# Patient Record
Sex: FEMALE | Race: BLACK OR AFRICAN AMERICAN | NOT HISPANIC OR LATINO
[De-identification: names, ages, dates, MRNs, and addresses within clinical notes are randomized per-mention and may not be internally consistent; named-entity substitution may affect disease eponyms.]

---

## 2017-01-18 ENCOUNTER — APPOINTMENT (OUTPATIENT)
Dept: OBGYN | Facility: HOSPITAL | Age: 37
End: 2017-01-18

## 2017-05-12 ENCOUNTER — MEDICATION RENEWAL (OUTPATIENT)
Age: 37
End: 2017-05-12

## 2017-06-22 ENCOUNTER — LABORATORY RESULT (OUTPATIENT)
Age: 37
End: 2017-06-22

## 2017-06-22 ENCOUNTER — CHART COPY (OUTPATIENT)
Age: 37
End: 2017-06-22

## 2017-06-22 ENCOUNTER — APPOINTMENT (OUTPATIENT)
Dept: OBGYN | Facility: HOSPITAL | Age: 37
End: 2017-06-22

## 2017-06-22 ENCOUNTER — OUTPATIENT (OUTPATIENT)
Dept: OUTPATIENT SERVICES | Facility: HOSPITAL | Age: 37
LOS: 1 days | End: 2017-06-22

## 2017-06-22 VITALS
HEART RATE: 89 BPM | BODY MASS INDEX: 26.66 KG/M2 | DIASTOLIC BLOOD PRESSURE: 71 MMHG | HEIGHT: 69 IN | WEIGHT: 180 LBS | SYSTOLIC BLOOD PRESSURE: 109 MMHG

## 2017-06-22 DIAGNOSIS — O02.1 MISSED ABORTION: Chronic | ICD-10-CM

## 2017-06-22 LAB
ALBUMIN SERPL ELPH-MCNC: 4.6 G/DL — SIGNIFICANT CHANGE UP (ref 3.3–5)
ALP SERPL-CCNC: 61 U/L — SIGNIFICANT CHANGE UP (ref 40–120)
ALT FLD-CCNC: 15 U/L — SIGNIFICANT CHANGE UP (ref 4–33)
AST SERPL-CCNC: 16 U/L — SIGNIFICANT CHANGE UP (ref 4–32)
BASOPHILS # BLD AUTO: 0.01 K/UL — SIGNIFICANT CHANGE UP (ref 0–0.2)
BASOPHILS NFR BLD AUTO: 0.2 % — SIGNIFICANT CHANGE UP (ref 0–2)
BILIRUB SERPL-MCNC: 0.6 MG/DL — SIGNIFICANT CHANGE UP (ref 0.2–1.2)
BUN SERPL-MCNC: 14 MG/DL — SIGNIFICANT CHANGE UP (ref 7–23)
CALCIUM SERPL-MCNC: 9.6 MG/DL — SIGNIFICANT CHANGE UP (ref 8.4–10.5)
CHLORIDE SERPL-SCNC: 101 MMOL/L — SIGNIFICANT CHANGE UP (ref 98–107)
CO2 SERPL-SCNC: 28 MMOL/L — SIGNIFICANT CHANGE UP (ref 22–31)
CREAT SERPL-MCNC: 0.96 MG/DL — SIGNIFICANT CHANGE UP (ref 0.5–1.3)
EOSINOPHIL # BLD AUTO: 0.09 K/UL — SIGNIFICANT CHANGE UP (ref 0–0.5)
EOSINOPHIL NFR BLD AUTO: 1.6 % — SIGNIFICANT CHANGE UP (ref 0–6)
GLUCOSE SERPL-MCNC: 87 MG/DL — SIGNIFICANT CHANGE UP (ref 70–99)
HBA1C BLD-MCNC: 5.9 % — HIGH (ref 4–5.6)
HBV SURFACE AG SER-ACNC: NEGATIVE — SIGNIFICANT CHANGE UP
HCT VFR BLD CALC: 41.5 % — SIGNIFICANT CHANGE UP (ref 34.5–45)
HGB BLD-MCNC: 13.4 G/DL — SIGNIFICANT CHANGE UP (ref 11.5–15.5)
HIV1 AG SER QL: SIGNIFICANT CHANGE UP
HIV1+2 AB SPEC QL: SIGNIFICANT CHANGE UP
IMM GRANULOCYTES NFR BLD AUTO: 0.2 % — SIGNIFICANT CHANGE UP (ref 0–1.5)
LYMPHOCYTES # BLD AUTO: 1.59 K/UL — SIGNIFICANT CHANGE UP (ref 1–3.3)
LYMPHOCYTES # BLD AUTO: 28.3 % — SIGNIFICANT CHANGE UP (ref 13–44)
MCHC RBC-ENTMCNC: 29.8 PG — SIGNIFICANT CHANGE UP (ref 27–34)
MCHC RBC-ENTMCNC: 32.3 % — SIGNIFICANT CHANGE UP (ref 32–36)
MCV RBC AUTO: 92.2 FL — SIGNIFICANT CHANGE UP (ref 80–100)
MONOCYTES # BLD AUTO: 0.37 K/UL — SIGNIFICANT CHANGE UP (ref 0–0.9)
MONOCYTES NFR BLD AUTO: 6.6 % — SIGNIFICANT CHANGE UP (ref 2–14)
NEUTROPHILS # BLD AUTO: 3.55 K/UL — SIGNIFICANT CHANGE UP (ref 1.8–7.4)
NEUTROPHILS NFR BLD AUTO: 63.1 % — SIGNIFICANT CHANGE UP (ref 43–77)
PLATELET # BLD AUTO: 289 K/UL — SIGNIFICANT CHANGE UP (ref 150–400)
PMV BLD: 11.9 FL — SIGNIFICANT CHANGE UP (ref 7–13)
POTASSIUM SERPL-MCNC: 3.8 MMOL/L — SIGNIFICANT CHANGE UP (ref 3.5–5.3)
POTASSIUM SERPL-SCNC: 3.8 MMOL/L — SIGNIFICANT CHANGE UP (ref 3.5–5.3)
PROT SERPL-MCNC: 7.7 G/DL — SIGNIFICANT CHANGE UP (ref 6–8.3)
RBC # BLD: 4.5 M/UL — SIGNIFICANT CHANGE UP (ref 3.8–5.2)
RBC # FLD: 12.8 % — SIGNIFICANT CHANGE UP (ref 10.3–14.5)
SODIUM SERPL-SCNC: 142 MMOL/L — SIGNIFICANT CHANGE UP (ref 135–145)
WBC # BLD: 5.62 K/UL — SIGNIFICANT CHANGE UP (ref 3.8–10.5)
WBC # FLD AUTO: 5.62 K/UL — SIGNIFICANT CHANGE UP (ref 3.8–10.5)

## 2017-06-23 DIAGNOSIS — R10.2 PELVIC AND PERINEAL PAIN: ICD-10-CM

## 2017-06-23 DIAGNOSIS — Z01.419 ENCOUNTER FOR GYNECOLOGICAL EXAMINATION (GENERAL) (ROUTINE) WITHOUT ABNORMAL FINDINGS: ICD-10-CM

## 2017-06-23 DIAGNOSIS — N76.0 ACUTE VAGINITIS: ICD-10-CM

## 2017-06-23 LAB
C TRACH RRNA SPEC QL NAA+PROBE: SIGNIFICANT CHANGE UP
HCV RNA SERPL NAA DL=5-ACNC: NOT DETECTED IU/ML — SIGNIFICANT CHANGE UP
HCV RNA SPEC NAA+PROBE-LOG IU: SIGNIFICANT CHANGE UP LOGIU/ML
N GONORRHOEA RRNA SPEC QL NAA+PROBE: SIGNIFICANT CHANGE UP
SPECIMEN SOURCE: SIGNIFICANT CHANGE UP
T PALLIDUM AB TITR SER: NEGATIVE — SIGNIFICANT CHANGE UP

## 2017-08-18 ENCOUNTER — OUTPATIENT (OUTPATIENT)
Dept: OUTPATIENT SERVICES | Facility: HOSPITAL | Age: 37
LOS: 1 days | End: 2017-08-18
Payer: MEDICAID

## 2017-08-18 ENCOUNTER — APPOINTMENT (OUTPATIENT)
Dept: ULTRASOUND IMAGING | Facility: CLINIC | Age: 37
End: 2017-08-18
Payer: MEDICAID

## 2017-08-18 DIAGNOSIS — O02.1 MISSED ABORTION: Chronic | ICD-10-CM

## 2017-08-18 DIAGNOSIS — R10.2 PELVIC AND PERINEAL PAIN: ICD-10-CM

## 2017-08-18 PROCEDURE — 76830 TRANSVAGINAL US NON-OB: CPT | Mod: 26

## 2017-08-18 PROCEDURE — 76856 US EXAM PELVIC COMPLETE: CPT

## 2017-08-18 PROCEDURE — 76830 TRANSVAGINAL US NON-OB: CPT

## 2017-08-18 PROCEDURE — 76856 US EXAM PELVIC COMPLETE: CPT | Mod: 26

## 2017-10-05 ENCOUNTER — LABORATORY RESULT (OUTPATIENT)
Age: 37
End: 2017-10-05

## 2017-10-05 ENCOUNTER — RESULT CHARGE (OUTPATIENT)
Age: 37
End: 2017-10-05

## 2017-10-05 ENCOUNTER — APPOINTMENT (OUTPATIENT)
Dept: OBGYN | Facility: HOSPITAL | Age: 37
End: 2017-10-05
Payer: MEDICAID

## 2017-10-05 ENCOUNTER — OUTPATIENT (OUTPATIENT)
Dept: OUTPATIENT SERVICES | Facility: HOSPITAL | Age: 37
LOS: 1 days | End: 2017-10-05

## 2017-10-05 VITALS
WEIGHT: 189.82 LBS | HEIGHT: 69 IN | DIASTOLIC BLOOD PRESSURE: 61 MMHG | BODY MASS INDEX: 28.11 KG/M2 | HEART RATE: 59 BPM | SYSTOLIC BLOOD PRESSURE: 103 MMHG

## 2017-10-05 DIAGNOSIS — O02.1 MISSED ABORTION: Chronic | ICD-10-CM

## 2017-10-05 PROCEDURE — 99213 OFFICE O/P EST LOW 20 MIN: CPT | Mod: GE

## 2017-10-05 RX ORDER — METRONIDAZOLE 500 MG/1
500 TABLET ORAL TWICE DAILY
Qty: 14 | Refills: 0 | Status: DISCONTINUED | COMMUNITY
Start: 2017-06-22 | End: 2017-10-05

## 2017-10-06 DIAGNOSIS — N91.2 AMENORRHEA, UNSPECIFIED: ICD-10-CM

## 2017-10-06 LAB
HCG SERPL-ACNC: < 5 MIU/ML — SIGNIFICANT CHANGE UP
HCG UR QL: NEGATIVE
QUALITY CONTROL: YES

## 2017-10-07 LAB — FSH SERPL-MCNC: 5.4 IU/L — SIGNIFICANT CHANGE UP

## 2017-10-16 ENCOUNTER — APPOINTMENT (OUTPATIENT)
Dept: OBGYN | Facility: HOSPITAL | Age: 37
End: 2017-10-16

## 2017-10-20 ENCOUNTER — OUTPATIENT (OUTPATIENT)
Dept: OUTPATIENT SERVICES | Facility: HOSPITAL | Age: 37
LOS: 1 days | End: 2017-10-20
Payer: MEDICAID

## 2017-10-20 ENCOUNTER — APPOINTMENT (OUTPATIENT)
Dept: ULTRASOUND IMAGING | Facility: CLINIC | Age: 37
End: 2017-10-20
Payer: MEDICAID

## 2017-10-20 DIAGNOSIS — Z00.8 ENCOUNTER FOR OTHER GENERAL EXAMINATION: ICD-10-CM

## 2017-10-20 DIAGNOSIS — N91.2 AMENORRHEA, UNSPECIFIED: ICD-10-CM

## 2017-10-20 DIAGNOSIS — O02.1 MISSED ABORTION: Chronic | ICD-10-CM

## 2017-10-20 PROCEDURE — 76831 ECHO EXAM UTERUS: CPT

## 2017-10-20 PROCEDURE — 76831 ECHO EXAM UTERUS: CPT | Mod: 26

## 2017-10-20 PROCEDURE — 58340 CATHETER FOR HYSTEROGRAPHY: CPT

## 2017-10-24 ENCOUNTER — LABORATORY RESULT (OUTPATIENT)
Age: 37
End: 2017-10-24

## 2017-10-24 ENCOUNTER — OUTPATIENT (OUTPATIENT)
Dept: OUTPATIENT SERVICES | Facility: HOSPITAL | Age: 37
LOS: 1 days | End: 2017-10-24

## 2017-10-24 ENCOUNTER — APPOINTMENT (OUTPATIENT)
Dept: OBGYN | Facility: HOSPITAL | Age: 37
End: 2017-10-24
Payer: MEDICAID

## 2017-10-24 VITALS
DIASTOLIC BLOOD PRESSURE: 61 MMHG | WEIGHT: 192 LBS | BODY MASS INDEX: 28.35 KG/M2 | HEART RATE: 67 BPM | SYSTOLIC BLOOD PRESSURE: 95 MMHG

## 2017-10-24 DIAGNOSIS — N91.5 OLIGOMENORRHEA, UNSPECIFIED: ICD-10-CM

## 2017-10-24 DIAGNOSIS — Z30.432 ENCOUNTER FOR REMOVAL OF INTRAUTERINE CONTRACEPTIVE DEVICE: ICD-10-CM

## 2017-10-24 DIAGNOSIS — O02.1 MISSED ABORTION: Chronic | ICD-10-CM

## 2017-10-24 LAB
HCG SERPL-ACNC: < 5 MIU/ML — SIGNIFICANT CHANGE UP
HCG UR QL: NEGATIVE
QUALITY CONTROL: YES
TSH SERPL-MCNC: 0.92 UIU/ML — SIGNIFICANT CHANGE UP (ref 0.27–4.2)

## 2017-10-24 PROCEDURE — 81025 URINE PREGNANCY TEST: CPT | Mod: GC,NC

## 2017-10-24 PROCEDURE — 99213 OFFICE O/P EST LOW 20 MIN: CPT | Mod: GC

## 2017-10-25 LAB — PROLACTIN SERPL-MCNC: 28.5 NG/ML — HIGH (ref 3.4–24.1)

## 2017-10-26 DIAGNOSIS — N91.5 OLIGOMENORRHEA, UNSPECIFIED: ICD-10-CM

## 2017-10-26 DIAGNOSIS — N91.2 AMENORRHEA, UNSPECIFIED: ICD-10-CM

## 2017-10-28 LAB — 17OH-PREG SERPL-MCNC: SIGNIFICANT CHANGE UP

## 2017-10-29 LAB — MISCELLANEOUS - CHEM: SIGNIFICANT CHANGE UP

## 2017-10-30 LAB
TESTOST FLD-SCNC: 60.4 NG/DL — HIGH (ref 10–55)
TESTOSTERONE.FREE+WB SERPL-MCNC: 1 PG/ML — SIGNIFICANT CHANGE UP (ref 0–4.2)

## 2017-11-08 ENCOUNTER — APPOINTMENT (OUTPATIENT)
Dept: OBGYN | Facility: HOSPITAL | Age: 37
End: 2017-11-08

## 2017-11-21 ENCOUNTER — APPOINTMENT (OUTPATIENT)
Dept: OBGYN | Facility: HOSPITAL | Age: 37
End: 2017-11-21
Payer: MEDICAID

## 2017-11-21 ENCOUNTER — LABORATORY RESULT (OUTPATIENT)
Age: 37
End: 2017-11-21

## 2017-11-21 ENCOUNTER — OUTPATIENT (OUTPATIENT)
Dept: OUTPATIENT SERVICES | Facility: HOSPITAL | Age: 37
LOS: 1 days | End: 2017-11-21

## 2017-11-21 VITALS
HEART RATE: 62 BPM | WEIGHT: 189.6 LBS | BODY MASS INDEX: 28.08 KG/M2 | DIASTOLIC BLOOD PRESSURE: 70 MMHG | SYSTOLIC BLOOD PRESSURE: 109 MMHG | HEIGHT: 69 IN

## 2017-11-21 DIAGNOSIS — O02.1 MISSED ABORTION: Chronic | ICD-10-CM

## 2017-11-21 PROCEDURE — 99214 OFFICE O/P EST MOD 30 MIN: CPT | Mod: 25,GC

## 2017-11-21 PROCEDURE — 58300 INSERT INTRAUTERINE DEVICE: CPT | Mod: GC

## 2017-11-21 RX ORDER — MEDROXYPROGESTERONE ACETATE 10 MG/1
10 TABLET ORAL DAILY
Qty: 10 | Refills: 0 | Status: COMPLETED | COMMUNITY
Start: 2017-10-24 | End: 2017-11-21

## 2017-11-22 DIAGNOSIS — N91.2 AMENORRHEA, UNSPECIFIED: ICD-10-CM

## 2017-11-22 DIAGNOSIS — Z30.431 ENCOUNTER FOR ROUTINE CHECKING OF INTRAUTERINE CONTRACEPTIVE DEVICE: ICD-10-CM

## 2017-11-22 LAB
C TRACH RRNA SPEC QL NAA+PROBE: SIGNIFICANT CHANGE UP
N GONORRHOEA RRNA SPEC QL NAA+PROBE: SIGNIFICANT CHANGE UP
SPECIMEN SOURCE: SIGNIFICANT CHANGE UP

## 2018-04-01 ENCOUNTER — OUTPATIENT (OUTPATIENT)
Dept: OUTPATIENT SERVICES | Facility: HOSPITAL | Age: 38
LOS: 1 days | End: 2018-04-01
Payer: MEDICAID

## 2018-04-01 DIAGNOSIS — O02.1 MISSED ABORTION: Chronic | ICD-10-CM

## 2018-04-01 PROCEDURE — G9001: CPT

## 2018-04-11 ENCOUNTER — APPOINTMENT (OUTPATIENT)
Dept: OBGYN | Facility: HOSPITAL | Age: 38
End: 2018-04-11
Payer: MEDICAID

## 2018-04-11 ENCOUNTER — OUTPATIENT (OUTPATIENT)
Dept: OUTPATIENT SERVICES | Facility: HOSPITAL | Age: 38
LOS: 1 days | End: 2018-04-11

## 2018-04-11 VITALS
HEART RATE: 85 BPM | HEIGHT: 69 IN | SYSTOLIC BLOOD PRESSURE: 119 MMHG | WEIGHT: 192 LBS | DIASTOLIC BLOOD PRESSURE: 65 MMHG | BODY MASS INDEX: 28.44 KG/M2

## 2018-04-11 DIAGNOSIS — O02.1 MISSED ABORTION: Chronic | ICD-10-CM

## 2018-04-11 PROCEDURE — 58301 REMOVE INTRAUTERINE DEVICE: CPT | Mod: GC

## 2018-04-11 PROCEDURE — 99213 OFFICE O/P EST LOW 20 MIN: CPT | Mod: 25,GC

## 2018-04-12 DIAGNOSIS — R10.9 UNSPECIFIED ABDOMINAL PAIN: ICD-10-CM

## 2018-04-12 DIAGNOSIS — Z30.9 ENCOUNTER FOR CONTRACEPTIVE MANAGEMENT, UNSPECIFIED: ICD-10-CM

## 2018-04-14 DIAGNOSIS — R69 ILLNESS, UNSPECIFIED: ICD-10-CM

## 2018-05-02 ENCOUNTER — LABORATORY RESULT (OUTPATIENT)
Age: 38
End: 2018-05-02

## 2018-05-02 ENCOUNTER — APPOINTMENT (OUTPATIENT)
Dept: OBGYN | Facility: HOSPITAL | Age: 38
End: 2018-05-02
Payer: MEDICAID

## 2018-05-02 ENCOUNTER — OUTPATIENT (OUTPATIENT)
Dept: OUTPATIENT SERVICES | Facility: HOSPITAL | Age: 38
LOS: 1 days | End: 2018-05-02

## 2018-05-02 VITALS
SYSTOLIC BLOOD PRESSURE: 110 MMHG | BODY MASS INDEX: 28.88 KG/M2 | WEIGHT: 195 LBS | HEART RATE: 85 BPM | HEIGHT: 69 IN | DIASTOLIC BLOOD PRESSURE: 79 MMHG

## 2018-05-02 DIAGNOSIS — Z86.19 PERSONAL HISTORY OF OTHER INFECTIOUS AND PARASITIC DISEASES: ICD-10-CM

## 2018-05-02 DIAGNOSIS — O02.1 MISSED ABORTION: Chronic | ICD-10-CM

## 2018-05-02 PROCEDURE — 99213 OFFICE O/P EST LOW 20 MIN: CPT | Mod: GE

## 2018-05-03 LAB
C TRACH RRNA SPEC QL NAA+PROBE: SIGNIFICANT CHANGE UP
HIV 1+2 AB+HIV1 P24 AG SERPL QL IA: SIGNIFICANT CHANGE UP
N GONORRHOEA RRNA SPEC QL NAA+PROBE: SIGNIFICANT CHANGE UP
SPECIMEN SOURCE: SIGNIFICANT CHANGE UP
SPECIMEN SOURCE: SIGNIFICANT CHANGE UP
T PALLIDUM AB TITR SER: NEGATIVE — SIGNIFICANT CHANGE UP
TRICHOMONAS VAGINALIS AMPLIFICATION RESULT: SIGNIFICANT CHANGE UP

## 2018-05-04 DIAGNOSIS — Z11.3 ENCOUNTER FOR SCREENING FOR INFECTIONS WITH A PREDOMINANTLY SEXUAL MODE OF TRANSMISSION: ICD-10-CM

## 2018-05-04 DIAGNOSIS — B37.3 CANDIDIASIS OF VULVA AND VAGINA: ICD-10-CM

## 2018-05-09 ENCOUNTER — EMERGENCY (EMERGENCY)
Facility: HOSPITAL | Age: 38
LOS: 1 days | End: 2018-05-09
Attending: EMERGENCY MEDICINE
Payer: MEDICAID

## 2018-05-09 VITALS
HEIGHT: 69 IN | WEIGHT: 190.04 LBS | HEART RATE: 70 BPM | OXYGEN SATURATION: 97 % | RESPIRATION RATE: 16 BRPM | SYSTOLIC BLOOD PRESSURE: 96 MMHG | DIASTOLIC BLOOD PRESSURE: 65 MMHG | TEMPERATURE: 98 F

## 2018-05-09 DIAGNOSIS — O02.1 MISSED ABORTION: Chronic | ICD-10-CM

## 2018-05-09 PROCEDURE — 99283 EMERGENCY DEPT VISIT LOW MDM: CPT

## 2018-05-09 PROCEDURE — 73562 X-RAY EXAM OF KNEE 3: CPT | Mod: 26,LT,RT

## 2018-05-09 PROCEDURE — 73660 X-RAY EXAM OF TOE(S): CPT | Mod: 26,LT

## 2018-05-09 PROCEDURE — 99284 EMERGENCY DEPT VISIT MOD MDM: CPT

## 2018-05-09 PROCEDURE — 73562 X-RAY EXAM OF KNEE 3: CPT

## 2018-05-09 PROCEDURE — 73660 X-RAY EXAM OF TOE(S): CPT

## 2018-05-09 RX ORDER — IBUPROFEN 200 MG
600 TABLET ORAL ONCE
Qty: 0 | Refills: 0 | Status: COMPLETED | OUTPATIENT
Start: 2018-05-09 | End: 2018-05-09

## 2018-05-09 RX ADMIN — Medication 600 MILLIGRAM(S): at 15:19

## 2018-05-09 RX ADMIN — Medication 600 MILLIGRAM(S): at 17:12

## 2018-05-09 NOTE — ED PROVIDER NOTE - PHYSICAL EXAMINATION
Attending note. Patient is alert and in no acute distress. Examination of the lower extremities reveals no swelling, erythema, ecchymosis or effusions. Patient has full range of motion of both knees. Patient has tenderness over the medial femoral condyles and tibial plateau bilaterally. There is no pain with valgus or varus stress of the knees. ACL is intact when stressed. There is no leg edema. Patient is a tender left second toe with swelling and resolving ecchymosis. Maximal tenderness is at the proximal phalanx. Distal pulses were intact and normal. Sensation is normal. Skin is otherwise normal.

## 2018-05-09 NOTE — ED ADULT NURSE NOTE - OBJECTIVE STATEMENT
38 y/o female presents to ED from home c/o b/l knee pain s/p fall while roller-skating. Patient reports she was roller-skating on Friday when she fell multiple times, knocking her knees together. Patient reports difficulty ambulating and stiffness when being in the same position for an extended period of time. Patient has +pulses, +sensation, +ROM in b/l LE, equal strength. Patient denies LOC, blood thinners, hitting head. Patient resting in bed, side rails up. Plan of care explained.

## 2018-05-09 NOTE — ED PROCEDURE NOTE - CPROC ED POST PROC CARE GUIDE1
Elevate the injured extremity as instructed./Instructed patient/caregiver to follow-up with primary care physician./Instructed patient/caregiver regarding signs and symptoms of infection./Keep the cast/splint/dressing clean and dry.

## 2018-05-09 NOTE — ED PROVIDER NOTE - MEDICAL DECISION MAKING DETAILS
Attending note-bilateral knee pain probably secondary to contusions. No pain with stress of the MCL. X-ray rule out fracture. X-ray of the left second toe to rule out fracture as well.

## 2018-05-09 NOTE — ED PROVIDER NOTE - CARE PLAN
Principal Discharge DX:	Contusion of knee, unspecified laterality, initial encounter Principal Discharge DX:	Contusion of knee, unspecified laterality, initial encounter  Goal:	bilateral knee contusions

## 2019-04-11 ENCOUNTER — LABORATORY RESULT (OUTPATIENT)
Age: 39
End: 2019-04-11

## 2019-04-11 ENCOUNTER — RESULT REVIEW (OUTPATIENT)
Age: 39
End: 2019-04-11

## 2019-04-11 ENCOUNTER — OUTPATIENT (OUTPATIENT)
Dept: OUTPATIENT SERVICES | Facility: HOSPITAL | Age: 39
LOS: 1 days | End: 2019-04-11

## 2019-04-11 ENCOUNTER — APPOINTMENT (OUTPATIENT)
Dept: OBGYN | Facility: HOSPITAL | Age: 39
End: 2019-04-11
Payer: MEDICAID

## 2019-04-11 VITALS
BODY MASS INDEX: 27.4 KG/M2 | DIASTOLIC BLOOD PRESSURE: 60 MMHG | HEART RATE: 79 BPM | WEIGHT: 185 LBS | SYSTOLIC BLOOD PRESSURE: 88 MMHG | HEIGHT: 69 IN

## 2019-04-11 DIAGNOSIS — Z20.2 CONTACT WITH AND (SUSPECTED) EXPOSURE TO INFECTIONS WITH A PREDOMINANTLY SEXUAL MODE OF TRANSMISSION: ICD-10-CM

## 2019-04-11 DIAGNOSIS — O02.1 MISSED ABORTION: Chronic | ICD-10-CM

## 2019-04-11 DIAGNOSIS — Z01.419 ENCOUNTER FOR GYNECOLOGICAL EXAMINATION (GENERAL) (ROUTINE) WITHOUT ABNORMAL FINDINGS: ICD-10-CM

## 2019-04-11 PROCEDURE — 99212 OFFICE O/P EST SF 10 MIN: CPT | Mod: 25,GC

## 2019-04-11 PROCEDURE — 99395 PREV VISIT EST AGE 18-39: CPT | Mod: GC

## 2019-04-12 LAB
C TRACH RRNA SPEC QL NAA+PROBE: SIGNIFICANT CHANGE UP
HBV SURFACE AG SER-ACNC: NONREACTIVE — SIGNIFICANT CHANGE UP
HIV 1+2 AB+HIV1 P24 AG SERPL QL IA: SIGNIFICANT CHANGE UP
HPV HIGH+LOW RISK DNA PNL CVX: SIGNIFICANT CHANGE UP
N GONORRHOEA RRNA SPEC QL NAA+PROBE: SIGNIFICANT CHANGE UP
SPECIMEN SOURCE: SIGNIFICANT CHANGE UP
T PALLIDUM AB TITR SER: NEGATIVE — SIGNIFICANT CHANGE UP

## 2019-04-15 LAB — CYTOLOGY SPEC DOC CYTO: SIGNIFICANT CHANGE UP

## 2019-04-27 NOTE — END OF VISIT
[] : Resident [>50% of Time Spent on Counseling and Coordination of Care for  ___] : Greater than 50% of the encounter time was spent on counseling and coordination of care for [unfilled] [Time Spent: ___ minutes] : I have spent [unfilled] minutes of face to face time with the patient [FreeTextEntry3] : discussed STD SCREENING

## 2019-04-27 NOTE — PHYSICAL EXAM
[Awake] : awake [Alert] : alert [Soft] : soft [Oriented x3] : oriented to person, place, and time [Normal] : uterus [No Bleeding] : there was no active vaginal bleeding [Pap Obtained] : a Pap smear was performed [Uterine Adnexae] : were not tender and not enlarged [Acute Distress] : no acute distress [Mass] : no breast mass [Nipple Discharge] : no nipple discharge [Axillary LAD] : no axillary lymphadenopathy [Tender] : non tender

## 2019-07-05 ENCOUNTER — LABORATORY RESULT (OUTPATIENT)
Age: 39
End: 2019-07-05

## 2019-07-05 ENCOUNTER — APPOINTMENT (OUTPATIENT)
Dept: INTERNAL MEDICINE | Facility: CLINIC | Age: 39
End: 2019-07-05
Payer: MEDICAID

## 2019-07-05 ENCOUNTER — OUTPATIENT (OUTPATIENT)
Dept: OUTPATIENT SERVICES | Facility: HOSPITAL | Age: 39
LOS: 1 days | End: 2019-07-05

## 2019-07-05 VITALS
SYSTOLIC BLOOD PRESSURE: 118 MMHG | DIASTOLIC BLOOD PRESSURE: 70 MMHG | HEART RATE: 82 BPM | BODY MASS INDEX: 26.81 KG/M2 | HEIGHT: 69 IN | WEIGHT: 181 LBS

## 2019-07-05 DIAGNOSIS — R53.83 OTHER FATIGUE: ICD-10-CM

## 2019-07-05 DIAGNOSIS — Z00.00 ENCOUNTER FOR GENERAL ADULT MEDICAL EXAMINATION WITHOUT ABNORMAL FINDINGS: ICD-10-CM

## 2019-07-05 DIAGNOSIS — R51 HEADACHE: ICD-10-CM

## 2019-07-05 DIAGNOSIS — R73.03 PREDIABETES.: ICD-10-CM

## 2019-07-05 DIAGNOSIS — R73.03 PREDIABETES: ICD-10-CM

## 2019-07-05 DIAGNOSIS — O02.1 MISSED ABORTION: Chronic | ICD-10-CM

## 2019-07-05 LAB
ALBUMIN SERPL ELPH-MCNC: 4.9 G/DL — SIGNIFICANT CHANGE UP (ref 3.3–5)
ALP SERPL-CCNC: 54 U/L — SIGNIFICANT CHANGE UP (ref 40–120)
ALT FLD-CCNC: 16 U/L — SIGNIFICANT CHANGE UP (ref 4–33)
ANION GAP SERPL CALC-SCNC: 10 MMO/L — SIGNIFICANT CHANGE UP (ref 7–14)
AST SERPL-CCNC: 15 U/L — SIGNIFICANT CHANGE UP (ref 4–32)
BASOPHILS # BLD AUTO: 0.03 K/UL — SIGNIFICANT CHANGE UP (ref 0–0.2)
BASOPHILS NFR BLD AUTO: 0.6 % — SIGNIFICANT CHANGE UP (ref 0–2)
BILIRUB SERPL-MCNC: 0.4 MG/DL — SIGNIFICANT CHANGE UP (ref 0.2–1.2)
BUN SERPL-MCNC: 12 MG/DL — SIGNIFICANT CHANGE UP (ref 7–23)
CALCIUM SERPL-MCNC: 9.7 MG/DL — SIGNIFICANT CHANGE UP (ref 8.4–10.5)
CHLORIDE SERPL-SCNC: 105 MMOL/L — SIGNIFICANT CHANGE UP (ref 98–107)
CHOLEST SERPL-MCNC: 181 MG/DL — SIGNIFICANT CHANGE UP (ref 120–199)
CO2 SERPL-SCNC: 26 MMOL/L — SIGNIFICANT CHANGE UP (ref 22–31)
CREAT SERPL-MCNC: 0.82 MG/DL — SIGNIFICANT CHANGE UP (ref 0.5–1.3)
EOSINOPHIL # BLD AUTO: 0.07 K/UL — SIGNIFICANT CHANGE UP (ref 0–0.5)
EOSINOPHIL NFR BLD AUTO: 1.4 % — SIGNIFICANT CHANGE UP (ref 0–6)
GLUCOSE BLDC GLUCOMTR-MCNC: 87
GLUCOSE SERPL-MCNC: 100 MG/DL — HIGH (ref 70–99)
HBA1C BLD-MCNC: 5.2 % — SIGNIFICANT CHANGE UP (ref 4–5.6)
HCT VFR BLD CALC: 41.8 % — SIGNIFICANT CHANGE UP (ref 34.5–45)
HDLC SERPL-MCNC: 51 MG/DL — SIGNIFICANT CHANGE UP (ref 45–65)
HGB BLD-MCNC: 13.5 G/DL — SIGNIFICANT CHANGE UP (ref 11.5–15.5)
IMM GRANULOCYTES NFR BLD AUTO: 0.2 % — SIGNIFICANT CHANGE UP (ref 0–1.5)
LIPID PNL WITH DIRECT LDL SERPL: 132 MG/DL — SIGNIFICANT CHANGE UP
LYMPHOCYTES # BLD AUTO: 1.89 K/UL — SIGNIFICANT CHANGE UP (ref 1–3.3)
LYMPHOCYTES # BLD AUTO: 37.9 % — SIGNIFICANT CHANGE UP (ref 13–44)
MCHC RBC-ENTMCNC: 30 PG — SIGNIFICANT CHANGE UP (ref 27–34)
MCHC RBC-ENTMCNC: 32.3 % — SIGNIFICANT CHANGE UP (ref 32–36)
MCV RBC AUTO: 92.9 FL — SIGNIFICANT CHANGE UP (ref 80–100)
MONOCYTES # BLD AUTO: 0.37 K/UL — SIGNIFICANT CHANGE UP (ref 0–0.9)
MONOCYTES NFR BLD AUTO: 7.4 % — SIGNIFICANT CHANGE UP (ref 2–14)
NEUTROPHILS # BLD AUTO: 2.62 K/UL — SIGNIFICANT CHANGE UP (ref 1.8–7.4)
NEUTROPHILS NFR BLD AUTO: 52.5 % — SIGNIFICANT CHANGE UP (ref 43–77)
NRBC # FLD: 0 K/UL — SIGNIFICANT CHANGE UP (ref 0–0)
PLATELET # BLD AUTO: 242 K/UL — SIGNIFICANT CHANGE UP (ref 150–400)
PMV BLD: 11.9 FL — SIGNIFICANT CHANGE UP (ref 7–13)
POTASSIUM SERPL-MCNC: 4.4 MMOL/L — SIGNIFICANT CHANGE UP (ref 3.5–5.3)
POTASSIUM SERPL-SCNC: 4.4 MMOL/L — SIGNIFICANT CHANGE UP (ref 3.5–5.3)
PROT SERPL-MCNC: 7.8 G/DL — SIGNIFICANT CHANGE UP (ref 6–8.3)
RBC # BLD: 4.5 M/UL — SIGNIFICANT CHANGE UP (ref 3.8–5.2)
RBC # FLD: 12.8 % — SIGNIFICANT CHANGE UP (ref 10.3–14.5)
SODIUM SERPL-SCNC: 141 MMOL/L — SIGNIFICANT CHANGE UP (ref 135–145)
T4 FREE SERPL-MCNC: 0.89 NG/DL — LOW (ref 0.9–1.8)
TRIGL SERPL-MCNC: 47 MG/DL — SIGNIFICANT CHANGE UP (ref 10–149)
TSH SERPL-MCNC: 1.05 UIU/ML — SIGNIFICANT CHANGE UP (ref 0.27–4.2)
WBC # BLD: 4.99 K/UL — SIGNIFICANT CHANGE UP (ref 3.8–10.5)
WBC # FLD AUTO: 4.99 K/UL — SIGNIFICANT CHANGE UP (ref 3.8–10.5)

## 2019-07-05 PROCEDURE — 99385 PREV VISIT NEW AGE 18-39: CPT

## 2019-07-05 RX ORDER — FLUCONAZOLE 150 MG/1
150 TABLET ORAL
Qty: 1 | Refills: 0 | Status: DISCONTINUED | COMMUNITY
Start: 2018-05-02 | End: 2019-07-05

## 2019-07-05 NOTE — ASSESSMENT
[FreeTextEntry1] : 39yoF presents to establish care.\par \par #HCM\par -Pap with HPV negative 4/2019 - repeat 2024\par -STI screening negative 4/2019\par -No recent Tdap - patient wants to defer until next visit\par -Labwork today: CBC, CMP, A1c, lipid panel, TSH with reflex fT4\par \par #Fatigue\par -Checking CBC, metabolic panel, TSH, A1c today\par -Patient does not currently feel that fatigue is related to mood but will readdress if labwork is WNL - patient is currently trying to establish care with a new therapist

## 2019-07-05 NOTE — HISTORY OF PRESENT ILLNESS
[de-identified] : 39yoF presents to establish care. Patient recently seen by gynecology 4/2019 for annual exam - pap with HPV and STI screening at that visit were WNL. \par \par Fatigue: Patient states that she feels tired all the time - she feels that this has been worse in the past few months. She is concerned about diabetes because she has a family history of this (she last had an A1c checked in 2017 and it was 5.9%). She states that she may have had mild polydipsia but denies polyuria or major weight changes in the past few months. She feels foggy at times at well and endorses HAs very frequently now. She also endorses feeling cold all of the time and feeling mildly constipated (still having BM every day thought usually). She did not eat breakfast this morning. She states that her menstruation has been fairly regular over this period - LMP about 3 weeks ago, on the lighter side usually. Patient has been taking gymnema kitty and fenugreek for about 2 weeks to try to help with symptoms - not sure if they are helping. POC FS 87.\par \par Depressed mood: Patient states that she has seen a therapist in the past and is currently finding another one. She was on an anti-depressant in the past but does not currently take medications. She is currently bothered by the political climate and her tiredness - states that her mood is "even" and not very sad or very happy. Denies feeling down or depressed and is still participating in activities that she enjoys (except when she is feeling too tired). She has no current SI/HI.

## 2019-07-05 NOTE — HEALTH RISK ASSESSMENT
[No] : No [Yes] : In the past 12 months have you used drugs other than those required for medical reasons? Yes [Patient reported PAP Smear was normal] : Patient reported PAP Smear was normal [Employed] : employed [Single] : single [Fully functional (bathing, dressing, toileting, transferring, walking, feeding)] : Fully functional (bathing, dressing, toileting, transferring, walking, feeding) [Fully functional (using the telephone, shopping, preparing meals, housekeeping, doing laundry, using] : Fully functional and needs no help or supervision to perform IADLs (using the telephone, shopping, preparing meals, housekeeping, doing laundry, using transportation, managing medications and managing finances) [Fair] :  ~his/her~ mood as fair [0] : 1) Little interest or pleasure doing things: Not at all (0) [FreeTextEntry1] : tiredness [] : No [de-identified] : marijuana very infrequently [de-identified] : no regular exercise [de-identified] : eats fruits and vegetables, eats proteins, cutting back on rice [EPM2Pmqui] : 0 [PapSmearDate] : 4/2019 [HIVComments] : negative [HIVDate] : 4/19 [HepatitisCDate] : 2/16 [HepatitisCComments] : negative [de-identified] : with friend, son, and god-daughter [FreeTextEntry2] : works as a [employer]

## 2019-07-05 NOTE — PHYSICAL EXAM
[Normal Sclera/Conjunctiva] : normal sclera/conjunctiva [PERRL] : pupils equal round and reactive to light [EOMI] : extraocular movements intact [Supple] : supple [Normal Oropharynx] : the oropharynx was normal [No Lymphadenopathy] : no lymphadenopathy [Normal] : no respiratory distress, lungs were clear to auscultation bilaterally and no accessory muscle use [Normal Rate] : normal rate  [Regular Rhythm] : with a regular rhythm [Normal S1, S2] : normal S1 and S2 [Pedal Pulses Present] : the pedal pulses are present [No Edema] : there was no peripheral edema [No Masses] : no palpable masses [Normal Appearance] : normal in appearance [Soft] : abdomen soft [No Nipple Discharge] : no nipple discharge [No Axillary Lymphadenopathy] : no axillary lymphadenopathy [Non Tender] : non-tender [Normal Bowel Sounds] : normal bowel sounds [Non-distended] : non-distended [Grossly Normal Strength/Tone] : grossly normal strength/tone [No Focal Deficits] : no focal deficits [Normal Insight/Judgement] : insight and judgment were intact [Normal Affect] : the affect was normal

## 2019-07-05 NOTE — REVIEW OF SYSTEMS
[Fatigue] : fatigue [Fever] : no fever [Chills] : no chills [Chest Pain] : no chest pain [Recent Change In Weight] : ~T no recent weight change [Shortness Of Breath] : no shortness of breath [Dysuria] : no dysuria [Frequency] : no frequency

## 2020-03-04 ENCOUNTER — EMERGENCY (EMERGENCY)
Facility: HOSPITAL | Age: 40
LOS: 1 days | Discharge: HOME | End: 2020-03-04
Attending: EMERGENCY MEDICINE
Payer: MEDICAID

## 2020-03-04 VITALS
HEART RATE: 71 BPM | SYSTOLIC BLOOD PRESSURE: 110 MMHG | RESPIRATION RATE: 16 BRPM | DIASTOLIC BLOOD PRESSURE: 68 MMHG | OXYGEN SATURATION: 100 % | TEMPERATURE: 98 F

## 2020-03-04 VITALS
SYSTOLIC BLOOD PRESSURE: 107 MMHG | RESPIRATION RATE: 16 BRPM | WEIGHT: 197.98 LBS | OXYGEN SATURATION: 96 % | HEART RATE: 90 BPM | HEIGHT: 69 IN | DIASTOLIC BLOOD PRESSURE: 72 MMHG | TEMPERATURE: 98 F

## 2020-03-04 DIAGNOSIS — O02.1 MISSED ABORTION: Chronic | ICD-10-CM

## 2020-03-04 LAB
ALBUMIN SERPL ELPH-MCNC: 4.1 G/DL — SIGNIFICANT CHANGE UP (ref 3.3–5)
ALP SERPL-CCNC: 63 U/L — SIGNIFICANT CHANGE UP (ref 40–120)
ALT FLD-CCNC: 15 U/L — SIGNIFICANT CHANGE UP (ref 10–45)
ANION GAP SERPL CALC-SCNC: 12 MMOL/L — SIGNIFICANT CHANGE UP (ref 5–17)
APPEARANCE UR: CLEAR — SIGNIFICANT CHANGE UP
AST SERPL-CCNC: 15 U/L — SIGNIFICANT CHANGE UP (ref 10–40)
BACTERIA # UR AUTO: ABNORMAL
BASOPHILS # BLD AUTO: 0.02 K/UL — SIGNIFICANT CHANGE UP (ref 0–0.2)
BASOPHILS NFR BLD AUTO: 0.4 % — SIGNIFICANT CHANGE UP (ref 0–2)
BILIRUB SERPL-MCNC: 0.4 MG/DL — SIGNIFICANT CHANGE UP (ref 0.2–1.2)
BILIRUB UR-MCNC: NEGATIVE — SIGNIFICANT CHANGE UP
BUN SERPL-MCNC: 13 MG/DL — SIGNIFICANT CHANGE UP (ref 7–23)
CALCIUM SERPL-MCNC: 9 MG/DL — SIGNIFICANT CHANGE UP (ref 8.4–10.5)
CHLORIDE SERPL-SCNC: 101 MMOL/L — SIGNIFICANT CHANGE UP (ref 96–108)
CO2 SERPL-SCNC: 22 MMOL/L — SIGNIFICANT CHANGE UP (ref 22–31)
COLOR SPEC: YELLOW — SIGNIFICANT CHANGE UP
CREAT SERPL-MCNC: 0.82 MG/DL — SIGNIFICANT CHANGE UP (ref 0.5–1.3)
DIFF PNL FLD: ABNORMAL
EOSINOPHIL # BLD AUTO: 0.02 K/UL — SIGNIFICANT CHANGE UP (ref 0–0.5)
EOSINOPHIL NFR BLD AUTO: 0.4 % — SIGNIFICANT CHANGE UP (ref 0–6)
EPI CELLS # UR: 5 /HPF — SIGNIFICANT CHANGE UP
GLUCOSE SERPL-MCNC: 100 MG/DL — HIGH (ref 70–99)
GLUCOSE UR QL: NEGATIVE — SIGNIFICANT CHANGE UP
HCG UR QL: NEGATIVE — SIGNIFICANT CHANGE UP
HCT VFR BLD CALC: 40.8 % — SIGNIFICANT CHANGE UP (ref 34.5–45)
HGB BLD-MCNC: 13.6 G/DL — SIGNIFICANT CHANGE UP (ref 11.5–15.5)
HYALINE CASTS # UR AUTO: 5 /LPF — HIGH (ref 0–2)
IMM GRANULOCYTES NFR BLD AUTO: 0.2 % — SIGNIFICANT CHANGE UP (ref 0–1.5)
KETONES UR-MCNC: NEGATIVE — SIGNIFICANT CHANGE UP
LEUKOCYTE ESTERASE UR-ACNC: NEGATIVE — SIGNIFICANT CHANGE UP
LIDOCAIN IGE QN: 17 U/L — SIGNIFICANT CHANGE UP (ref 7–60)
LYMPHOCYTES # BLD AUTO: 0.89 K/UL — LOW (ref 1–3.3)
LYMPHOCYTES # BLD AUTO: 16.4 % — SIGNIFICANT CHANGE UP (ref 13–44)
MCHC RBC-ENTMCNC: 30.3 PG — SIGNIFICANT CHANGE UP (ref 27–34)
MCHC RBC-ENTMCNC: 33.3 GM/DL — SIGNIFICANT CHANGE UP (ref 32–36)
MCV RBC AUTO: 90.9 FL — SIGNIFICANT CHANGE UP (ref 80–100)
MONOCYTES # BLD AUTO: 0.36 K/UL — SIGNIFICANT CHANGE UP (ref 0–0.9)
MONOCYTES NFR BLD AUTO: 6.6 % — SIGNIFICANT CHANGE UP (ref 2–14)
NEUTROPHILS # BLD AUTO: 4.13 K/UL — SIGNIFICANT CHANGE UP (ref 1.8–7.4)
NEUTROPHILS NFR BLD AUTO: 76 % — SIGNIFICANT CHANGE UP (ref 43–77)
NITRITE UR-MCNC: NEGATIVE — SIGNIFICANT CHANGE UP
NRBC # BLD: 0 /100 WBCS — SIGNIFICANT CHANGE UP (ref 0–0)
PH UR: 6 — SIGNIFICANT CHANGE UP (ref 5–8)
PLATELET # BLD AUTO: 224 K/UL — SIGNIFICANT CHANGE UP (ref 150–400)
POTASSIUM SERPL-MCNC: 3.9 MMOL/L — SIGNIFICANT CHANGE UP (ref 3.5–5.3)
POTASSIUM SERPL-SCNC: 3.9 MMOL/L — SIGNIFICANT CHANGE UP (ref 3.5–5.3)
PROT SERPL-MCNC: 7.2 G/DL — SIGNIFICANT CHANGE UP (ref 6–8.3)
PROT UR-MCNC: ABNORMAL
RBC # BLD: 4.49 M/UL — SIGNIFICANT CHANGE UP (ref 3.8–5.2)
RBC # FLD: 12 % — SIGNIFICANT CHANGE UP (ref 10.3–14.5)
RBC CASTS # UR COMP ASSIST: 3 /HPF — SIGNIFICANT CHANGE UP (ref 0–4)
SODIUM SERPL-SCNC: 135 MMOL/L — SIGNIFICANT CHANGE UP (ref 135–145)
SP GR SPEC: 1.03 — HIGH (ref 1.01–1.02)
UROBILINOGEN FLD QL: NEGATIVE — SIGNIFICANT CHANGE UP
WBC # BLD: 5.43 K/UL — SIGNIFICANT CHANGE UP (ref 3.8–10.5)
WBC # FLD AUTO: 5.43 K/UL — SIGNIFICANT CHANGE UP (ref 3.8–10.5)
WBC UR QL: 3 /HPF — SIGNIFICANT CHANGE UP (ref 0–5)

## 2020-03-04 PROCEDURE — 83690 ASSAY OF LIPASE: CPT

## 2020-03-04 PROCEDURE — 81025 URINE PREGNANCY TEST: CPT

## 2020-03-04 PROCEDURE — 99282 EMERGENCY DEPT VISIT SF MDM: CPT

## 2020-03-04 PROCEDURE — 85027 COMPLETE CBC AUTOMATED: CPT

## 2020-03-04 PROCEDURE — 93005 ELECTROCARDIOGRAM TRACING: CPT

## 2020-03-04 PROCEDURE — 96374 THER/PROPH/DIAG INJ IV PUSH: CPT

## 2020-03-04 PROCEDURE — 99285 EMERGENCY DEPT VISIT HI MDM: CPT

## 2020-03-04 PROCEDURE — 76705 ECHO EXAM OF ABDOMEN: CPT

## 2020-03-04 PROCEDURE — 93010 ELECTROCARDIOGRAM REPORT: CPT

## 2020-03-04 PROCEDURE — 81001 URINALYSIS AUTO W/SCOPE: CPT

## 2020-03-04 PROCEDURE — 76705 ECHO EXAM OF ABDOMEN: CPT | Mod: 26

## 2020-03-04 PROCEDURE — 99284 EMERGENCY DEPT VISIT MOD MDM: CPT | Mod: 25

## 2020-03-04 PROCEDURE — 87086 URINE CULTURE/COLONY COUNT: CPT

## 2020-03-04 PROCEDURE — 80053 COMPREHEN METABOLIC PANEL: CPT

## 2020-03-04 RX ORDER — SUCRALFATE 1 G
1 TABLET ORAL ONCE
Refills: 0 | Status: COMPLETED | OUTPATIENT
Start: 2020-03-04 | End: 2020-03-04

## 2020-03-04 RX ORDER — ONDANSETRON 8 MG/1
4 TABLET, FILM COATED ORAL ONCE
Refills: 0 | Status: DISCONTINUED | OUTPATIENT
Start: 2020-03-04 | End: 2020-03-04

## 2020-03-04 RX ORDER — FAMOTIDINE 10 MG/ML
20 INJECTION INTRAVENOUS ONCE
Refills: 0 | Status: COMPLETED | OUTPATIENT
Start: 2020-03-04 | End: 2020-03-04

## 2020-03-04 RX ORDER — SODIUM CHLORIDE 9 MG/ML
1000 INJECTION INTRAMUSCULAR; INTRAVENOUS; SUBCUTANEOUS ONCE
Refills: 0 | Status: COMPLETED | OUTPATIENT
Start: 2020-03-04 | End: 2020-03-04

## 2020-03-04 RX ORDER — PANTOPRAZOLE SODIUM 20 MG/1
1 TABLET, DELAYED RELEASE ORAL
Qty: 20 | Refills: 0
Start: 2020-03-04 | End: 2020-03-23

## 2020-03-04 RX ORDER — ONDANSETRON 8 MG/1
4 TABLET, FILM COATED ORAL ONCE
Refills: 0 | Status: COMPLETED | OUTPATIENT
Start: 2020-03-04 | End: 2020-03-04

## 2020-03-04 RX ORDER — SUCRALFATE 1 G
1 TABLET ORAL
Qty: 21 | Refills: 0
Start: 2020-03-04 | End: 2020-03-10

## 2020-03-04 RX ADMIN — ONDANSETRON 4 MILLIGRAM(S): 8 TABLET, FILM COATED ORAL at 13:39

## 2020-03-04 RX ADMIN — FAMOTIDINE 20 MILLIGRAM(S): 10 INJECTION INTRAVENOUS at 16:56

## 2020-03-04 RX ADMIN — Medication 1 GRAM(S): at 17:51

## 2020-03-04 RX ADMIN — SODIUM CHLORIDE 1000 MILLILITER(S): 9 INJECTION INTRAMUSCULAR; INTRAVENOUS; SUBCUTANEOUS at 14:12

## 2020-03-04 NOTE — ED ADULT NURSE NOTE - OBJECTIVE STATEMENT
1405 39 yr old BF c/o epigastric pain x 3 days associated with nausea and decreased appetite. A&Ox4. ambulatory. Was eval by Qdoc in triage waiting room IVL intact LACF without sx of infilt. Blood work and urine were sent earlier. Received Zofran ODT in waiting room with some relief of nausea. Skin W&D. lips and nailbeds pink. Lungs clear. abd soft. Sore to palp

## 2020-03-04 NOTE — ED ADULT NURSE NOTE - NSIMPLEMENTINTERV_GEN_ALL_ED
Implemented All Universal Safety Interventions:  [place] to call system. Call bell, personal items and telephone within reach. Instruct patient to call for assistance. Room bathroom lighting operational. Non-slip footwear when patient is off stretcher. Physically safe environment: no spills, clutter or unnecessary equipment. Stretcher in lowest position, wheels locked, appropriate side rails in place.

## 2020-03-04 NOTE — ED PROVIDER NOTE - OBJECTIVE STATEMENT
38 yo female with no PMHx p/w abdominal pain.  patient reports that she developed epigastric abd pain 3 days ago.  Pain is a little bit worse at night, but nothing makes the pain better.  No association with food.  Denies fevers, CP, SOB, vomiting, diarrhea, dysuria.  No hx of surgeries.  no recent travel or sick contacts.

## 2020-03-04 NOTE — ED PROVIDER NOTE - NSFOLLOWUPINSTRUCTIONS_ED_ALL_ED_FT
1.  Stay well hydrated  2.  Take Protonix 40mgs daily and carafate 1g 3 x per day before meals  3.  Follow up with your PMD in 2-3 days.  bring a copy of your results with you to your appointment       Follow up with Gastroenterology upon discharge  4.  Return to the ER for worsening pain, fevers/chills, persistent nausea/vomiting, black or blood stools or any other concerning symptoms

## 2020-03-04 NOTE — ED PROVIDER NOTE - RAPID ASSESSMENT
38 y/o f presents for abd pain for 3 days. diminished PO. ate salad last night, and pain was severe. pain is midline. cold, chills but no measured temp. did wake up sweating but feels tired. Monday had an intense headache and had joint pain. no cough. no sneezing. loose stools. no recent travel. pain sometimes radiates down but not laterally. BM last was this morning. no hx of surgeries in the past. taken at home APAP / probiotics / teas, bland foods and water with no change in pain. resting has not helped.

## 2020-03-04 NOTE — ED PROVIDER NOTE - PATIENT PORTAL LINK FT
You can access the FollowMyHealth Patient Portal offered by [facility] by registering at the following website: [email or web address]. By joining [facility]’s FollowMyHealth portal, you will also be able to view your health information using other applications (apps) compatible with our system.

## 2020-03-04 NOTE — ED PROVIDER NOTE - NSFOLLOWUPCLINICS_GEN_ALL_ED_FT
[facility]  Gastroenterology  [address] [place] [id number]  Phone: [phone]  Fax:   Follow Up Time:

## 2020-03-04 NOTE — ED PROVIDER NOTE - ATTENDING CONTRIBUTION TO CARE
Attending Statement (TEENA Lainez MD):    HPI: 40 y/o F c/o epigastric pain for the past 3 days; has had similar pain before, worse when laying down at night; no fever, no vomiting, no diarrhea.  Upper abdominal pain, described as burning, indicates epigastric region, non-radiating, occurs intermittently.  No h/o surgery, no recent travel.    A 14-point ROS was performed with pertinent information as noted above    PSH/PMH as noted above     On Physical Exam:  General: well appearing, in NAD, speaking clearly in full sentences and without difficulty; cooperative with exam  HEENT: PERRL, MMM  Neck: no neck tenderness, no nuchal rigidity  Cardiac: normal s1, s2; RRR; no MGR  Lungs: CTABL  Abdomen: mild epigastric tenderness; no RUQ tenderness, negative irizarry's sign; no rebound/guarding; abdomen is nondistended.  no overlying skin changes/rashes  : no bladder tenderness or distension  Skin: intact, no rash  Extremities: no peripheral edema, no gross deformities  Neuro: no gross neurologic deficits     AP: Epigastric pain, likely gastritis/GERD/PUD; obtain labs: cbc (eval for anemia if signficant anemia need to consider bleeding ulcer), CMP (to evaluate for electrolyte abnormalities or renal/liver dysfunction), lipase (r/o pancreatitis) and UCG (r/o pregnancy).  ECG obtained, nsr with signs of acute ischemia/infarct, very unlikely these symptoms represents cardiac etiology.  Presentation not c/w aortic dissection or pulmonary embolism.  No fever/chills to suggest acute infectious etiology.  No RUQ tenderness, but symptoms could represent biliary colic / choledocholithiasis; will obtain RUQ US to evaluate further.    ED Course: patient's symptoms improved with medication in ED (pepcid, carafate); labs nonactionable, lipase not c/w pancreatitis; US shows no evidence of cholelithiasis or cholecystitis.  Stable for dc with continued evaluation/management as outpatient, referred to GI.

## 2020-03-04 NOTE — ED PROVIDER NOTE - PROGRESS NOTE DETAILS
Patient seen at bedside in NAD.  VSS.  Patient resting comfortably without complaints. Patient reports that her pain has improved.  US negative for acute sean. WIll DC home on protonix and carafate with close GI follow up.  Strict return precautions provided.  -Russ Jay PA-C

## 2020-03-05 LAB
CULTURE RESULTS: SIGNIFICANT CHANGE UP
SPECIMEN SOURCE: SIGNIFICANT CHANGE UP

## 2020-03-11 ENCOUNTER — APPOINTMENT (OUTPATIENT)
Dept: GASTROENTEROLOGY | Facility: CLINIC | Age: 40
End: 2020-03-11
Payer: MEDICAID

## 2020-03-11 VITALS
OXYGEN SATURATION: 98 % | RESPIRATION RATE: 16 BRPM | SYSTOLIC BLOOD PRESSURE: 102 MMHG | HEART RATE: 74 BPM | DIASTOLIC BLOOD PRESSURE: 74 MMHG | HEIGHT: 69 IN | BODY MASS INDEX: 28.58 KG/M2 | WEIGHT: 193 LBS

## 2020-03-11 PROCEDURE — 36415 COLL VENOUS BLD VENIPUNCTURE: CPT

## 2020-03-11 PROCEDURE — 99203 OFFICE O/P NEW LOW 30 MIN: CPT | Mod: 25

## 2020-03-11 NOTE — HISTORY OF PRESENT ILLNESS
[FreeTextEntry1] : 39F with no PMH recently in ED 3/4 with abdominal pain, here for GI evaluation. \par \par Patient was in ED 3/4 with complaints of 3 days of abdominal pain, decreased PO intake. Reported associated headache, joint pain at onset of symptoms. Denied recent travel, sick contacts. Patient was started on Protonix and carafate and referred for urgent GI follow up. ED labs 3/4 notable for abnormal U/A (trace blood, moderate bacteria, increased protein), but no growth on culture. Abdominal US without evidence of cholelithiasis or cholecystitis. CBC and CMP were otherwise overall unremarkable. \par \par Patient reports that symptoms have greatly improved since last week, nearly 80% better. She had increased stool frequency and softer stools last week, which have also improved. No associated fevers/chills. No clear food trigger or sick contacts. \par \par Patient reports that she has had mild intermittent GI symptoms for the last year. "I am being ruled by my stomach." Notes frequent bloating, epigastric discomfort and intermittent dysphagia to both liquids and solids. No change in appetite or weight loss; states she is gaining weight. Symptoms usually worse after eating. She has tried OTC herbal supplements with some improvement. Also tried Tums with mild relief. No NSAIDs. \par \par At baseline, will have formed daily BM in morning. However, intermittently she will note softer and more frequent stools. \par \par Admits that she has an unhealthy diet. She will eat "everything."\par \par No prior EGD or colonoscopy. No FH of GI malignancy.

## 2020-03-11 NOTE — PHYSICAL EXAM
[General Appearance - Alert] : alert [General Appearance - In No Acute Distress] : in no acute distress [FreeTextEntry1] : overweight [Sclera] : the sclera and conjunctiva were normal [Outer Ear] : the ears and nose were normal in appearance [Neck Appearance] : the appearance of the neck was normal [Auscultation Breath Sounds / Voice Sounds] : lungs were clear to auscultation bilaterally [Heart Rate And Rhythm] : heart rate was normal and rhythm regular [Heart Sounds] : normal S1 and S2 [Heart Sounds Gallop] : no gallops [Murmurs] : no murmurs [Heart Sounds Pericardial Friction Rub] : no pericardial rub [Edema] : there was no peripheral edema [Bowel Sounds] : normal bowel sounds [Abdomen Soft] : soft [Abdomen Tenderness] : non-tender [Abdomen Mass (___ Cm)] : no abdominal mass palpated [Involuntary Movements] : no involuntary movements were seen [Skin Color & Pigmentation] : normal skin color and pigmentation [Skin Turgor] : normal skin turgor [] : no rash [No Focal Deficits] : no focal deficits [Oriented To Time, Place, And Person] : oriented to person, place, and time [Impaired Insight] : insight and judgment were intact [Affect] : the affect was normal

## 2020-03-11 NOTE — ASSESSMENT
[FreeTextEntry1] : 39F with no PMH recently in ED 3/4 with abdominal pain, here for GI evaluation. \par \par #Dysphagia: Notes intermittent dysphagia to liquids/solids for last 1-2 years. Possible atypical GERD, but need to rule out alternate etiologies, including EoE. \par --EGD with esophageal biopsies\par \par #Abdominal pain, bloating, intermittent diarrhea: Suspect likely functional etiology. Last week's symptoms likely self-limited acute gastroenteritis (now much improved). \par --EGD, as above, with gastric/duodenal biopsies\par --Stool testing to r/o infectious etiology\par --Celiac serologies\par --Pending EGD results, may initiate trial of daily PPI 20mg\par --Counseled on dietary and lifestyle modifications including: weight loss, avoidance of acid reflux-inducing foods (excessive caffeine, chocolate, alcohol, peppermint, fatty foods), avoidance of late or large meals (eating at least 3+ hours prior to bedtime)\par \par #Overweight: \par --Encouraged dietary/lifestyle modifications\par \par I have discussed the indications, benefits, risks (including, but not limited to, anesthesia, infection, bleeding perforation, missed lesions), and alternates to the procedure(s) with the patient.

## 2020-03-12 LAB
ALBUMIN SERPL ELPH-MCNC: 4.5 G/DL
ALP BLD-CCNC: 66 U/L
ALT SERPL-CCNC: 18 U/L
ANION GAP SERPL CALC-SCNC: 12 MMOL/L
AST SERPL-CCNC: 16 U/L
BASOPHILS # BLD AUTO: 0.03 K/UL
BASOPHILS NFR BLD AUTO: 0.6 %
BILIRUB SERPL-MCNC: 0.4 MG/DL
BUN SERPL-MCNC: 10 MG/DL
CALCIUM SERPL-MCNC: 9.2 MG/DL
CHLORIDE SERPL-SCNC: 104 MMOL/L
CO2 SERPL-SCNC: 26 MMOL/L
CREAT SERPL-MCNC: 0.85 MG/DL
EOSINOPHIL # BLD AUTO: 0.1 K/UL
EOSINOPHIL NFR BLD AUTO: 2.1 %
GLUCOSE SERPL-MCNC: 94 MG/DL
HCT VFR BLD CALC: 40.8 %
HGB BLD-MCNC: 13.5 G/DL
IGA SER QL IEP: 165 MG/DL
IMM GRANULOCYTES NFR BLD AUTO: 0.4 %
LYMPHOCYTES # BLD AUTO: 1.59 K/UL
LYMPHOCYTES NFR BLD AUTO: 33.4 %
MAN DIFF?: NORMAL
MCHC RBC-ENTMCNC: 30.8 PG
MCHC RBC-ENTMCNC: 33.1 GM/DL
MCV RBC AUTO: 92.9 FL
MONOCYTES # BLD AUTO: 0.27 K/UL
MONOCYTES NFR BLD AUTO: 5.7 %
NEUTROPHILS # BLD AUTO: 2.75 K/UL
NEUTROPHILS NFR BLD AUTO: 57.8 %
PLATELET # BLD AUTO: 317 K/UL
POTASSIUM SERPL-SCNC: 4.7 MMOL/L
PROT SERPL-MCNC: 6.9 G/DL
RBC # BLD: 4.39 M/UL
RBC # FLD: 12.3 %
SODIUM SERPL-SCNC: 142 MMOL/L
TSH SERPL-ACNC: 1.19 UIU/ML
WBC # FLD AUTO: 4.76 K/UL

## 2020-03-13 ENCOUNTER — APPOINTMENT (OUTPATIENT)
Dept: INTERNAL MEDICINE | Facility: CLINIC | Age: 40
End: 2020-03-13

## 2020-03-13 ENCOUNTER — OUTPATIENT (OUTPATIENT)
Dept: OUTPATIENT SERVICES | Facility: HOSPITAL | Age: 40
LOS: 1 days | End: 2020-03-13

## 2020-03-13 VITALS
HEIGHT: 69 IN | SYSTOLIC BLOOD PRESSURE: 102 MMHG | DIASTOLIC BLOOD PRESSURE: 54 MMHG | WEIGHT: 194 LBS | HEART RATE: 64 BPM | RESPIRATION RATE: 14 BRPM | BODY MASS INDEX: 28.73 KG/M2 | OXYGEN SATURATION: 96 %

## 2020-03-13 DIAGNOSIS — R13.10 DYSPHAGIA, UNSPECIFIED: ICD-10-CM

## 2020-03-13 DIAGNOSIS — O02.1 MISSED ABORTION: Chronic | ICD-10-CM

## 2020-03-13 LAB
C DIFF TOX GENS STL QL NAA+PROBE: NORMAL
CDIFF BY PCR: NOT DETECTED
ENDOMYSIUM IGA SER QL: NEGATIVE
ENDOMYSIUM IGA TITR SER: NORMAL
GI PCR PANEL, STOOL: ABNORMAL
TTG IGA SER IA-ACNC: <1.2 U/ML
TTG IGA SER-ACNC: NEGATIVE
TTG IGG SER IA-ACNC: 1.6 U/ML
TTG IGG SER IA-ACNC: NEGATIVE

## 2020-03-13 NOTE — HISTORY OF PRESENT ILLNESS
[de-identified] : 39yoF presents for follow up. Patient was recently in the ER for abdominal pain - US at that time was negative for cholecystitis or cholelithiasis. Patient followed up with GI earlier this week - labwork at that time showed CBC, CMP, TSH, transglutaminase WNL. Per GI note, they are planning for EGD - patient states that this is scheduled 4/1/2020.\par \par Patient states that she is feeling better currently. She was having severe epigastric pain for 3 days which caused her to go to the ER. She denies current N/V - she is having diarrhea about once per week (has very frequent BMs when this happens for about 1 day). She denies any fevers/chills. She denies any current urinary symptoms. She has been having abnormal menses - she is following up with gynecology in the near future.

## 2020-03-13 NOTE — PHYSICAL EXAM
[Normal] : no acute distress, well nourished, well developed and well-appearing [Normal Sclera/Conjunctiva] : normal sclera/conjunctiva [EOMI] : extraocular movements intact [No Respiratory Distress] : no respiratory distress  [No Accessory Muscle Use] : no accessory muscle use [Clear to Auscultation] : lungs were clear to auscultation bilaterally [Normal Rate] : normal rate  [Regular Rhythm] : with a regular rhythm [Normal S1, S2] : normal S1 and S2 [Soft] : abdomen soft [Non Tender] : non-tender [Non-distended] : non-distended [Normal Bowel Sounds] : normal bowel sounds [No Rash] : no rash [No Skin Lesions] : no skin lesions [No Focal Deficits] : no focal deficits [Normal Gait] : normal gait [Normal Affect] : the affect was normal [Normal Mood] : the mood was normal

## 2020-03-13 NOTE — ASSESSMENT
[FreeTextEntry1] : 39yoF presents for follow up after ER visit for abdominal pain.\par \par #Abdominal pain, dysphagia\par -EGD with GI in near future\par -Discussed dietary modifications as well with patient today\par \par #Irregular menses\par -GYN follow up scheduled\par \par #HCM\par -Tdap today\par -Pap with HPV negative 4/2019 - repeat 2024\par -STI screening negative 4/2019

## 2020-03-13 NOTE — REVIEW OF SYSTEMS
[Abdominal Pain] : abdominal pain [Diarrhea] : diarrhea [Fever] : no fever [Chills] : no chills [Nausea] : no nausea [Vomiting] : no vomiting [Dysuria] : no dysuria [Hematuria] : no hematuria

## 2020-05-05 ENCOUNTER — APPOINTMENT (OUTPATIENT)
Dept: OBGYN | Facility: HOSPITAL | Age: 40
End: 2020-05-05

## 2020-05-20 ENCOUNTER — APPOINTMENT (OUTPATIENT)
Dept: GASTROENTEROLOGY | Facility: HOSPITAL | Age: 40
End: 2020-05-20

## 2020-05-28 ENCOUNTER — LABORATORY RESULT (OUTPATIENT)
Age: 40
End: 2020-05-28

## 2020-05-28 ENCOUNTER — APPOINTMENT (OUTPATIENT)
Dept: OBGYN | Facility: HOSPITAL | Age: 40
End: 2020-05-28
Payer: MEDICAID

## 2020-05-28 ENCOUNTER — OUTPATIENT (OUTPATIENT)
Dept: OUTPATIENT SERVICES | Facility: HOSPITAL | Age: 40
LOS: 1 days | End: 2020-05-28

## 2020-05-28 VITALS
WEIGHT: 191 LBS | BODY MASS INDEX: 28.29 KG/M2 | HEIGHT: 69 IN | DIASTOLIC BLOOD PRESSURE: 69 MMHG | HEART RATE: 66 BPM | SYSTOLIC BLOOD PRESSURE: 108 MMHG

## 2020-05-28 DIAGNOSIS — O02.1 MISSED ABORTION: Chronic | ICD-10-CM

## 2020-05-28 DIAGNOSIS — N92.6 IRREGULAR MENSTRUATION, UNSPECIFIED: ICD-10-CM

## 2020-05-28 DIAGNOSIS — N91.2 AMENORRHEA, UNSPECIFIED: ICD-10-CM

## 2020-05-28 DIAGNOSIS — R39.15 URGENCY OF URINATION: ICD-10-CM

## 2020-05-28 DIAGNOSIS — Z80.3 FAMILY HISTORY OF MALIGNANT NEOPLASM OF BREAST: ICD-10-CM

## 2020-05-28 LAB
ALBUMIN SERPL ELPH-MCNC: 4.9 G/DL — SIGNIFICANT CHANGE UP (ref 3.3–5)
ALP SERPL-CCNC: 59 U/L — SIGNIFICANT CHANGE UP (ref 40–120)
ALT FLD-CCNC: 15 U/L — SIGNIFICANT CHANGE UP (ref 4–33)
ANION GAP SERPL CALC-SCNC: 11 MMO/L — SIGNIFICANT CHANGE UP (ref 7–14)
APPEARANCE UR: SIGNIFICANT CHANGE UP
AST SERPL-CCNC: 16 U/L — SIGNIFICANT CHANGE UP (ref 4–32)
BACTERIA # UR AUTO: HIGH
BILIRUB SERPL-MCNC: 0.9 MG/DL — SIGNIFICANT CHANGE UP (ref 0.2–1.2)
BILIRUB UR-MCNC: NEGATIVE — SIGNIFICANT CHANGE UP
BLOOD UR QL VISUAL: NEGATIVE — SIGNIFICANT CHANGE UP
BUN SERPL-MCNC: 8 MG/DL — SIGNIFICANT CHANGE UP (ref 7–23)
CALCIUM SERPL-MCNC: 9.7 MG/DL — SIGNIFICANT CHANGE UP (ref 8.4–10.5)
CHLORIDE SERPL-SCNC: 103 MMOL/L — SIGNIFICANT CHANGE UP (ref 98–107)
CO2 SERPL-SCNC: 27 MMOL/L — SIGNIFICANT CHANGE UP (ref 22–31)
COLOR SPEC: YELLOW — SIGNIFICANT CHANGE UP
CREAT SERPL-MCNC: 0.88 MG/DL — SIGNIFICANT CHANGE UP (ref 0.5–1.3)
GLUCOSE SERPL-MCNC: 85 MG/DL — SIGNIFICANT CHANGE UP (ref 70–99)
GLUCOSE UR-MCNC: NEGATIVE — SIGNIFICANT CHANGE UP
HBV SURFACE AG SER-ACNC: NEGATIVE — SIGNIFICANT CHANGE UP
HYALINE CASTS # UR AUTO: HIGH
KETONES UR-MCNC: NEGATIVE — SIGNIFICANT CHANGE UP
LEUKOCYTE ESTERASE UR-ACNC: SIGNIFICANT CHANGE UP
NITRITE UR-MCNC: NEGATIVE — SIGNIFICANT CHANGE UP
PH UR: 6 — SIGNIFICANT CHANGE UP (ref 5–8)
POTASSIUM SERPL-MCNC: 3.6 MMOL/L — SIGNIFICANT CHANGE UP (ref 3.5–5.3)
POTASSIUM SERPL-SCNC: 3.6 MMOL/L — SIGNIFICANT CHANGE UP (ref 3.5–5.3)
PROT SERPL-MCNC: 7.3 G/DL — SIGNIFICANT CHANGE UP (ref 6–8.3)
PROT UR-MCNC: 20 — SIGNIFICANT CHANGE UP
RBC CASTS # UR COMP ASSIST: SIGNIFICANT CHANGE UP (ref 0–?)
SODIUM SERPL-SCNC: 141 MMOL/L — SIGNIFICANT CHANGE UP (ref 135–145)
SP GR SPEC: 1.02 — SIGNIFICANT CHANGE UP (ref 1–1.04)
SQUAMOUS # UR AUTO: SIGNIFICANT CHANGE UP
T4 FREE SERPL-MCNC: 1.05 NG/DL — SIGNIFICANT CHANGE UP (ref 0.9–1.8)
TSH SERPL-MCNC: 1.15 UIU/ML — SIGNIFICANT CHANGE UP (ref 0.27–4.2)
UROBILINOGEN FLD QL: NORMAL — SIGNIFICANT CHANGE UP
WBC UR QL: SIGNIFICANT CHANGE UP (ref 0–?)

## 2020-05-28 PROCEDURE — 99213 OFFICE O/P EST LOW 20 MIN: CPT | Mod: GE

## 2020-05-28 NOTE — COUNSELING
[Breast Self Exam] : breast self exam [Nutrition] : nutrition [Exercise] : exercise [STD (testing, results, tx)] : STD (testing, results, tx) [Contraception] : contraception [Safe Sexual Practices] : safe sexual practices [Weight Management] : weight management

## 2020-05-29 LAB
DENV1 AB SER-ACNC: 248 UG/DL — SIGNIFICANT CHANGE UP (ref 45–270)
ESTRADIOL FREE SERPL-MCNC: 118 PG/ML — SIGNIFICANT CHANGE UP
FSH SERPL-MCNC: 5.3 IU/L — SIGNIFICANT CHANGE UP
HCG UR QL: NEGATIVE
HIV 1+2 AB+HIV1 P24 AG SERPL QL IA: SIGNIFICANT CHANGE UP
LH SERPL-ACNC: 15.7 IU/L — SIGNIFICANT CHANGE UP
PROLACTIN SERPL-MCNC: 20.1 NG/ML — SIGNIFICANT CHANGE UP (ref 3.4–24.1)
QUALITY CONTROL: YES
RUBV IGG SER-ACNC: 3.8 INDEX — SIGNIFICANT CHANGE UP
RUBV IGG SER-IMP: POSITIVE — SIGNIFICANT CHANGE UP
T PALLIDUM AB TITR SER: NEGATIVE — SIGNIFICANT CHANGE UP

## 2020-05-30 LAB
CANDIDA AB TITR SER: DETECTED — HIGH
G VAGINALIS DNA SPEC QL NAA+PROBE: NOT DETECTED — SIGNIFICANT CHANGE UP
T VAGINALIS SPEC QL WET PREP: NOT DETECTED — SIGNIFICANT CHANGE UP

## 2020-06-01 RX ORDER — FLUCONAZOLE 150 MG/1
150 TABLET ORAL
Qty: 1 | Refills: 0 | Status: ACTIVE | COMMUNITY
Start: 2020-06-01 | End: 1900-01-01

## 2020-06-01 NOTE — PHYSICAL EXAM
[Awake] : awake [Alert] : alert [Soft] : soft [Oriented x3] : oriented to person, place, and time [Labia Minora] : labia minora [Labia Majora] : labia major [Normal] : uterus [No Bleeding] : there was no active vaginal bleeding [Uterine Adnexae] : were not tender and not enlarged [Acute Distress] : no acute distress [Mass] : no breast mass [Tender] : no tenderness [Nipple Discharge] : no nipple discharge [Axillary LAD] : no axillary lymphadenopathy [Motion Tenderness] : there was no cervical motion tenderness [Adnexa Tenderness] : were not tender [Ovarian Mass (___ Cm)] : there were no adnexal masses

## 2020-06-02 LAB
TESTOST FLD-SCNC: 114.3 NG/DL — HIGH (ref 10–55)
TESTOSTERONE.FREE+WB SERPL-MCNC: 4.2 PG/ML — SIGNIFICANT CHANGE UP (ref 0–4.2)

## 2020-06-08 DIAGNOSIS — R39.15 URGENCY OF URINATION: ICD-10-CM

## 2020-06-08 DIAGNOSIS — Z11.3 ENCOUNTER FOR SCREENING FOR INFECTIONS WITH A PREDOMINANTLY SEXUAL MODE OF TRANSMISSION: ICD-10-CM

## 2020-06-08 DIAGNOSIS — N91.2 AMENORRHEA, UNSPECIFIED: ICD-10-CM

## 2020-06-08 DIAGNOSIS — N89.8 OTHER SPECIFIED NONINFLAMMATORY DISORDERS OF VAGINA: ICD-10-CM

## 2020-06-15 ENCOUNTER — OUTPATIENT (OUTPATIENT)
Dept: OUTPATIENT SERVICES | Facility: HOSPITAL | Age: 40
LOS: 1 days | End: 2020-06-15
Payer: MEDICAID

## 2020-06-15 ENCOUNTER — APPOINTMENT (OUTPATIENT)
Dept: ULTRASOUND IMAGING | Facility: CLINIC | Age: 40
End: 2020-06-15
Payer: MEDICAID

## 2020-06-15 DIAGNOSIS — O02.1 MISSED ABORTION: Chronic | ICD-10-CM

## 2020-06-15 DIAGNOSIS — N92.6 IRREGULAR MENSTRUATION, UNSPECIFIED: ICD-10-CM

## 2020-06-15 PROCEDURE — 76830 TRANSVAGINAL US NON-OB: CPT

## 2020-06-15 PROCEDURE — 76830 TRANSVAGINAL US NON-OB: CPT | Mod: 26

## 2020-06-25 ENCOUNTER — APPOINTMENT (OUTPATIENT)
Dept: OBGYN | Facility: HOSPITAL | Age: 40
End: 2020-06-25
Payer: MEDICAID

## 2020-06-25 ENCOUNTER — OUTPATIENT (OUTPATIENT)
Dept: OUTPATIENT SERVICES | Facility: HOSPITAL | Age: 40
LOS: 1 days | End: 2020-06-25

## 2020-06-25 ENCOUNTER — LABORATORY RESULT (OUTPATIENT)
Age: 40
End: 2020-06-25

## 2020-06-25 VITALS
BODY MASS INDEX: 27.7 KG/M2 | HEIGHT: 69 IN | TEMPERATURE: 98.5 F | WEIGHT: 187 LBS | SYSTOLIC BLOOD PRESSURE: 108 MMHG | DIASTOLIC BLOOD PRESSURE: 55 MMHG | HEART RATE: 64 BPM

## 2020-06-25 DIAGNOSIS — O02.1 MISSED ABORTION: Chronic | ICD-10-CM

## 2020-06-25 LAB
ALBUMIN SERPL ELPH-MCNC: 4.5 G/DL — SIGNIFICANT CHANGE UP (ref 3.3–5)
ALP SERPL-CCNC: 51 U/L — SIGNIFICANT CHANGE UP (ref 40–120)
ALT FLD-CCNC: 9 U/L — SIGNIFICANT CHANGE UP (ref 4–33)
ANION GAP SERPL CALC-SCNC: 10 MMO/L — SIGNIFICANT CHANGE UP (ref 7–14)
AST SERPL-CCNC: 10 U/L — SIGNIFICANT CHANGE UP (ref 4–32)
BILIRUB SERPL-MCNC: 1 MG/DL — SIGNIFICANT CHANGE UP (ref 0.2–1.2)
BUN SERPL-MCNC: 13 MG/DL — SIGNIFICANT CHANGE UP (ref 7–23)
CALCIUM SERPL-MCNC: 9.6 MG/DL — SIGNIFICANT CHANGE UP (ref 8.4–10.5)
CHLORIDE SERPL-SCNC: 104 MMOL/L — SIGNIFICANT CHANGE UP (ref 98–107)
CO2 SERPL-SCNC: 27 MMOL/L — SIGNIFICANT CHANGE UP (ref 22–31)
CREAT SERPL-MCNC: 1.37 MG/DL — HIGH (ref 0.5–1.3)
GLUCOSE SERPL-MCNC: 94 MG/DL — SIGNIFICANT CHANGE UP (ref 70–99)
HBA1C BLD-MCNC: 5.4 % — SIGNIFICANT CHANGE UP (ref 4–5.6)
POTASSIUM SERPL-MCNC: 4.2 MMOL/L — SIGNIFICANT CHANGE UP (ref 3.5–5.3)
POTASSIUM SERPL-SCNC: 4.2 MMOL/L — SIGNIFICANT CHANGE UP (ref 3.5–5.3)
PROT SERPL-MCNC: 6.9 G/DL — SIGNIFICANT CHANGE UP (ref 6–8.3)
SODIUM SERPL-SCNC: 141 MMOL/L — SIGNIFICANT CHANGE UP (ref 135–145)

## 2020-06-25 PROCEDURE — 99213 OFFICE O/P EST LOW 20 MIN: CPT | Mod: GE

## 2020-06-25 RX ORDER — FLUCONAZOLE 150 MG/1
150 TABLET ORAL
Qty: 1 | Refills: 0 | Status: ACTIVE | COMMUNITY
Start: 2020-06-25 | End: 1900-01-01

## 2020-06-25 NOTE — COUNSELING
[Nutrition] : nutrition [Exercise] : exercise [Contraception] : contraception [Lab Results] : lab results [Weight Management] : weight management

## 2020-06-26 DIAGNOSIS — E28.2 POLYCYSTIC OVARIAN SYNDROME: ICD-10-CM

## 2020-06-26 DIAGNOSIS — R73.03 PREDIABETES: ICD-10-CM

## 2020-07-03 LAB
SARS-COV-2 IGG SERPL IA-ACNC: 9.25 AU/ML
SARS-COV-2 IGG SERPL QL IA: NEGATIVE

## 2020-07-05 DIAGNOSIS — Z01.818 ENCOUNTER FOR OTHER PREPROCEDURAL EXAMINATION: ICD-10-CM

## 2020-07-06 ENCOUNTER — APPOINTMENT (OUTPATIENT)
Dept: DISASTER EMERGENCY | Facility: CLINIC | Age: 40
End: 2020-07-06

## 2020-07-06 RX ORDER — TERCONAZOLE 8 MG/G
0.8 CREAM VAGINAL
Qty: 1 | Refills: 1 | Status: ACTIVE | COMMUNITY
Start: 2020-07-06 | End: 1900-01-01

## 2020-07-07 LAB — SARS-COV-2 N GENE NPH QL NAA+PROBE: NOT DETECTED

## 2020-07-08 ENCOUNTER — APPOINTMENT (OUTPATIENT)
Dept: GASTROENTEROLOGY | Facility: HOSPITAL | Age: 40
End: 2020-07-08

## 2020-07-08 ENCOUNTER — OUTPATIENT (OUTPATIENT)
Dept: OUTPATIENT SERVICES | Facility: HOSPITAL | Age: 40
LOS: 1 days | Discharge: HOME | End: 2020-07-08
Payer: MEDICAID

## 2020-07-08 VITALS
WEIGHT: 190.04 LBS | HEART RATE: 49 BPM | SYSTOLIC BLOOD PRESSURE: 100 MMHG | TEMPERATURE: 98 F | DIASTOLIC BLOOD PRESSURE: 61 MMHG | RESPIRATION RATE: 18 BRPM | OXYGEN SATURATION: 99 % | HEIGHT: 69 IN

## 2020-07-08 VITALS
OXYGEN SATURATION: 99 % | HEART RATE: 52 BPM | RESPIRATION RATE: 19 BRPM | SYSTOLIC BLOOD PRESSURE: 100 MMHG | DIASTOLIC BLOOD PRESSURE: 62 MMHG

## 2020-07-08 DIAGNOSIS — T18.2XXA FOREIGN BODY IN STOMACH, INITIAL ENCOUNTER: ICD-10-CM

## 2020-07-08 DIAGNOSIS — R10.9 UNSPECIFIED ABDOMINAL PAIN: ICD-10-CM

## 2020-07-08 DIAGNOSIS — O02.1 MISSED ABORTION: Chronic | ICD-10-CM

## 2020-07-08 LAB — HCG UR QL: NEGATIVE — SIGNIFICANT CHANGE UP

## 2020-07-08 PROCEDURE — 43239 EGD BIOPSY SINGLE/MULTIPLE: CPT | Mod: GC

## 2020-07-08 RX ORDER — SODIUM CHLORIDE 9 MG/ML
1000 INJECTION INTRAMUSCULAR; INTRAVENOUS; SUBCUTANEOUS
Refills: 0 | Status: DISCONTINUED | OUTPATIENT
Start: 2020-07-08 | End: 2020-07-23

## 2020-07-21 PROBLEM — T18.2XXA GASTRIC BEZOAR: Status: ACTIVE | Noted: 2020-07-21

## 2020-08-14 ENCOUNTER — APPOINTMENT (OUTPATIENT)
Dept: INTERNAL MEDICINE | Facility: CLINIC | Age: 40
End: 2020-08-14
Payer: MEDICAID

## 2020-08-14 ENCOUNTER — LABORATORY RESULT (OUTPATIENT)
Age: 40
End: 2020-08-14

## 2020-08-14 ENCOUNTER — OUTPATIENT (OUTPATIENT)
Dept: OUTPATIENT SERVICES | Facility: HOSPITAL | Age: 40
LOS: 1 days | End: 2020-08-14

## 2020-08-14 VITALS
OXYGEN SATURATION: 99 % | SYSTOLIC BLOOD PRESSURE: 116 MMHG | DIASTOLIC BLOOD PRESSURE: 64 MMHG | BODY MASS INDEX: 26.81 KG/M2 | HEIGHT: 69 IN | WEIGHT: 181 LBS | RESPIRATION RATE: 15 BRPM | HEART RATE: 95 BPM

## 2020-08-14 DIAGNOSIS — R89.9 UNSPECIFIED ABNORMAL FINDING IN SPECIMENS FROM OTHER ORGANS, SYSTEMS AND TISSUES: ICD-10-CM

## 2020-08-14 DIAGNOSIS — M25.562 PAIN IN RIGHT KNEE: ICD-10-CM

## 2020-08-14 DIAGNOSIS — Z11.59 ENCOUNTER FOR SCREENING FOR OTHER VIRAL DISEASES: ICD-10-CM

## 2020-08-14 DIAGNOSIS — O02.1 MISSED ABORTION: Chronic | ICD-10-CM

## 2020-08-14 DIAGNOSIS — M25.561 PAIN IN RIGHT KNEE: ICD-10-CM

## 2020-08-14 LAB
ANION GAP SERPL CALC-SCNC: 12 MMO/L — SIGNIFICANT CHANGE UP (ref 7–14)
BUN SERPL-MCNC: 11 MG/DL — SIGNIFICANT CHANGE UP (ref 7–23)
CALCIUM SERPL-MCNC: 9.4 MG/DL — SIGNIFICANT CHANGE UP (ref 8.4–10.5)
CHLORIDE SERPL-SCNC: 106 MMOL/L — SIGNIFICANT CHANGE UP (ref 98–107)
CO2 SERPL-SCNC: 24 MMOL/L — SIGNIFICANT CHANGE UP (ref 22–31)
CREAT SERPL-MCNC: 0.84 MG/DL — SIGNIFICANT CHANGE UP (ref 0.5–1.3)
GLUCOSE SERPL-MCNC: 87 MG/DL — SIGNIFICANT CHANGE UP (ref 70–99)
POTASSIUM SERPL-MCNC: 3.8 MMOL/L — SIGNIFICANT CHANGE UP (ref 3.5–5.3)
POTASSIUM SERPL-SCNC: 3.8 MMOL/L — SIGNIFICANT CHANGE UP (ref 3.5–5.3)
SODIUM SERPL-SCNC: 142 MMOL/L — SIGNIFICANT CHANGE UP (ref 135–145)

## 2020-08-14 PROCEDURE — 99213 OFFICE O/P EST LOW 20 MIN: CPT

## 2020-08-14 NOTE — HISTORY OF PRESENT ILLNESS
[de-identified] : 40yoF presents for follow up of abdominal pain - she recently had an EGD with GI last month showing a bezoar - she was recommended to have a gastric emptying study performed. She has a follow up appointment with GI next week. She states that she is still having loose stool - she has as many as 4 to 5 bowel movements per day and they are usually loose. She denies any melena or BRBPR. She states that she is now limiting food intake because of nausea and persistent GI issues. She feels constantly nauseous - she has tried devika which does not help. She denies weight loss but per flowsheets has lost about 10 pounds since the spring. Patient also endorses just not feeling well - she states that she has had issues with knee pains for a long time. She does not describe the feeling in her joints as "pain" but notes that she is more "aware" of her knees - denies activities that cause pain or make the sensation worse. She does not do any regular exercise. When she takes Tylenol or Advil, symptoms go away. She denies fevers/chills.\par \par LMP 8/3/2020 - she has been having irregular periods, saw GYN for this, was told that she has PCOS - she is not interested in OCPs currently. A1c with GYN WNL. CMP done at last GYN visit 6/20 with increased Cr - patient states that she has no urinary symptoms, has been staying well hydrated and urinating normally.

## 2020-08-14 NOTE — REVIEW OF SYSTEMS
[Nausea] : nausea [Abdominal Pain] : abdominal pain [Diarrhea] : diarrhea [Joint Pain] : joint pain [Shortness Of Breath] : no shortness of breath [Chills] : no chills [Fever] : no fever [Cough] : no cough [Constipation] : no constipation [Vomiting] : no vomiting [Melena] : no melena [Hematuria] : no hematuria [Incontinence] : no incontinence [Dysuria] : no dysuria [Joint Swelling] : no joint swelling [Frequency] : no frequency [Joint Stiffness] : no joint stiffness [Muscle Weakness] : no muscle weakness

## 2020-08-14 NOTE — ASSESSMENT
[FreeTextEntry1] : 40yoF presents for follow up of abdominal pain, knee pain.\par \par #Abdominal pain, nausea\par -EGD showing bezoar - has GI follow up next week, advised to closely follow with GI and have gastric emptying study as recommended\par \par #Knee pain\par -Exam benign, advised to increased exercise and stretching, use Tylenol/Advil PRN for pain\par \par #Elevated Cr\par -Repeat BMP today\par \par #HCM\par -Pap with HPV negative 4/2019 - repeat 2024\par -Tdap 2020\par -Patient had COVID IgG Ab testing in June (negative) - interested in repeat testing today\par \par **F/u in 3 months for CPE

## 2020-08-14 NOTE — PHYSICAL EXAM
[Normal] : no acute distress, well nourished, well developed and well-appearing [Normal Sclera/Conjunctiva] : normal sclera/conjunctiva [No Respiratory Distress] : no respiratory distress  [EOMI] : extraocular movements intact [No Accessory Muscle Use] : no accessory muscle use [Clear to Auscultation] : lungs were clear to auscultation bilaterally [Normal Rate] : normal rate  [Regular Rhythm] : with a regular rhythm [Pedal Pulses Present] : the pedal pulses are present [No Edema] : there was no peripheral edema [Normal S1, S2] : normal S1 and S2 [Non Tender] : non-tender [Soft] : abdomen soft [Non-distended] : non-distended [Normal Bowel Sounds] : normal bowel sounds [No Joint Swelling] : no joint swelling [Grossly Normal Strength/Tone] : grossly normal strength/tone [No Rash] : no rash [No Skin Lesions] : no skin lesions [No Focal Deficits] : no focal deficits [Normal Insight/Judgement] : insight and judgment were intact [Normal Gait] : normal gait [Normal Affect] : the affect was normal

## 2020-08-15 LAB
SARS-COV-2 IGG SERPL QL IA: NEGATIVE — SIGNIFICANT CHANGE UP
SARS-COV-2 IGM SERPL IA-ACNC: 6.03 AU/ML — SIGNIFICANT CHANGE UP

## 2020-08-17 ENCOUNTER — APPOINTMENT (OUTPATIENT)
Dept: GASTROENTEROLOGY | Facility: CLINIC | Age: 40
End: 2020-08-17
Payer: MEDICAID

## 2020-08-17 VITALS
BODY MASS INDEX: 27.25 KG/M2 | DIASTOLIC BLOOD PRESSURE: 64 MMHG | TEMPERATURE: 96.6 F | WEIGHT: 184 LBS | SYSTOLIC BLOOD PRESSURE: 98 MMHG | OXYGEN SATURATION: 98 % | RESPIRATION RATE: 16 BRPM | HEART RATE: 62 BPM | HEIGHT: 69 IN

## 2020-08-17 DIAGNOSIS — R19.7 DIARRHEA, UNSPECIFIED: ICD-10-CM

## 2020-08-17 PROCEDURE — 99214 OFFICE O/P EST MOD 30 MIN: CPT

## 2020-08-17 NOTE — PHYSICAL EXAM
[General Appearance - Alert] : alert [Sclera] : the sclera and conjunctiva were normal [Outer Ear] : the ears and nose were normal in appearance [General Appearance - In No Acute Distress] : in no acute distress [Exaggerated Use Of Accessory Muscles For Inspiration] : no accessory muscle use [Neck Appearance] : the appearance of the neck was normal [Heart Rate And Rhythm] : heart rate was normal and rhythm regular [Edema] : there was no peripheral edema [Veins - Varicosity Changes] : there were no varicosital changes [Bowel Sounds] : normal bowel sounds [Abdomen Soft] : soft [Abdomen Mass (___ Cm)] : no abdominal mass palpated [Abdomen Tenderness] : non-tender [Involuntary Movements] : no involuntary movements were seen [Skin Color & Pigmentation] : normal skin color and pigmentation [Abnormal Walk] : normal gait [Oriented To Time, Place, And Person] : oriented to person, place, and time [Impaired Insight] : insight and judgment were intact [] : no rash [Affect] : the affect was normal

## 2020-08-17 NOTE — HISTORY OF PRESENT ILLNESS
[FreeTextEntry1] : 39F with PCOS recently in ED 3/4 with abdominal pain, here for follow up. \par \par History: Patient seen in ED in 3/2020 for abdominal pain and decreased PO intake as well as associated headache, joint pain at onset of symptoms. ED labs 3/4 notable for abnormal U/A (trace blood, moderate bacteria, increased protein), but no growth on culture. Abdominal US without evidence of cholelithiasis or cholecystitis. CBC and CMP were otherwise overall unremarkable. Patient started on PPI and carafate and referred to GI. At initial visit in GI office the following week (3/2020), patient noted improved symptoms. However, she reported that she had mild intermittent GI symptoms for ~1 year. "I am being ruled by my stomach." Noted frequent bloating, epigastric discomfort and intermittent dysphagia to both liquids and solids. No change in appetite or weight loss; states she is gaining weight. Symptoms usually worse after eating. She has tried OTC herbal supplements with some improvement. Also tried Tums with mild relief. No NSAIDs. At baseline, will have formed daily BM in morning. However, intermittently she will note softer and more frequent stools. Admits that she has an unhealthy diet. She will eat "everything." No FH of GI malignancy. \par \par Interval: Patient underwent stool testing (only notable for Astrovirus, negative for C diff and other infections). CBC, CMP, TSH, and celiac serologies normal. EGD 7/8/20 notable for retained gastric contents. She was referred for gastric emptying study, but not yet done (or scheduled). \par Stool testing and celiac serology. \par \par Reports daily symptoms. Eating well, but not a "regular terrible eater." Always aware of what she eats. Feels very full/heavy and with discomfort. Bloating and nausea still occur after all PO intake. Reports symptoms less if eats less/smaller meals. Has 2-3 loose stools in AM, within 1 hour. Sometimes wakes up with loose stools as well. Though previously diarrhea appeared to improve, her symptoms have significant worsened since our last visit. sometimes up to 4-6 loose BM/day. No blood in stool. Lost 20lb since 2/2020. Not taking any medications at this time.

## 2020-08-17 NOTE — ASSESSMENT
[FreeTextEntry1] : 39F with PCOS here for follow up of abdominal pain, nausea, bloating and diarrhea. \par \par #Abdominal pain, bloating, intermittent diarrhea: EGD attempted, but noted to have phytobezoar and procedure aborted. May have underlying gastroparesis. Functional GI disorders also remain on differential. HgbA1c 5.4%. Patient underwent stool testing (only notable for Astrovirus, negative for C diff and other infections). CBC, CMP, TSH, and celiac serologies normal. \par --NM GES (reordered)\par --Will repeat stool studies and check stool calprotectin to rule out new/recurrent infection and evaluate for inflammation\par --Given intermittent night-time awakening from diarrhea, would benefit from colonoscopy with random biopsies\par --Plan for EGD and colonoscopy (clear liquid diet day prior to EGD hopefully should help with gastric clearance)\par --Counseled on dietary and lifestyle modifications including: weight loss, avoidance of acid reflux-inducing foods (excessive caffeine, chocolate, alcohol, peppermint, fatty foods), avoidance of late or large meals (eating at least 3+ hours prior to bedtime)\par --If endoscopic evaluation unremarkable, will plan for CT to evaluate significant weight loss\par \par #Dysphagia: Notes intermittent dysphagia to liquids/solids for last 1-2 years. Possible atypical GERD, but need to rule out alternate etiologies, including EoE. \par --EGD with esophageal biopsies\par \par The patient's symptoms were discussed at length and possible underlying causes, options for further diagnostic testing, and treatment measures were reviewed.\par \par RTO 2 months, after above workup

## 2020-08-18 DIAGNOSIS — Z11.59 ENCOUNTER FOR SCREENING FOR OTHER VIRAL DISEASES: ICD-10-CM

## 2020-08-18 DIAGNOSIS — M25.561 PAIN IN RIGHT KNEE: ICD-10-CM

## 2020-08-18 DIAGNOSIS — R89.9 UNSPECIFIED ABNORMAL FINDING IN SPECIMENS FROM OTHER ORGANS, SYSTEMS AND TISSUES: ICD-10-CM

## 2020-08-18 DIAGNOSIS — R10.9 UNSPECIFIED ABDOMINAL PAIN: ICD-10-CM

## 2020-08-27 PROBLEM — R19.7 DIARRHEA OF PRESUMED INFECTIOUS ORIGIN: Status: ACTIVE | Noted: 2020-08-27

## 2020-08-27 LAB
C DIFF TOX GENS STL QL NAA+PROBE: NORMAL
CDIFF BY PCR: NOT DETECTED
GI PCR PANEL, STOOL: ABNORMAL

## 2020-08-27 RX ORDER — AZITHROMYCIN 500 MG/1
500 TABLET, FILM COATED ORAL ONCE
Qty: 2 | Refills: 0 | Status: ACTIVE | COMMUNITY
Start: 2020-08-27 | End: 1900-01-01

## 2020-09-01 LAB
FAT STL QN: NORMAL
FAT STL QN: NORMAL

## 2020-09-08 LAB — CALPROTECTIN FECAL: <16 UG/G

## 2020-09-17 ENCOUNTER — OUTPATIENT (OUTPATIENT)
Dept: OUTPATIENT SERVICES | Facility: HOSPITAL | Age: 40
LOS: 1 days | End: 2020-09-17

## 2020-09-17 ENCOUNTER — APPOINTMENT (OUTPATIENT)
Dept: NUCLEAR MEDICINE | Facility: HOSPITAL | Age: 40
End: 2020-09-17
Payer: MEDICAID

## 2020-09-17 DIAGNOSIS — R14.0 ABDOMINAL DISTENSION (GASEOUS): ICD-10-CM

## 2020-09-17 DIAGNOSIS — O02.1 MISSED ABORTION: Chronic | ICD-10-CM

## 2020-09-17 PROCEDURE — 78264 GASTRIC EMPTYING IMG STUDY: CPT | Mod: 26

## 2020-09-28 ENCOUNTER — OUTPATIENT (OUTPATIENT)
Dept: OUTPATIENT SERVICES | Facility: HOSPITAL | Age: 40
LOS: 1 days | End: 2020-09-28

## 2020-09-28 ENCOUNTER — APPOINTMENT (OUTPATIENT)
Dept: OBGYN | Facility: HOSPITAL | Age: 40
End: 2020-09-28
Payer: MEDICAID

## 2020-09-28 ENCOUNTER — LABORATORY RESULT (OUTPATIENT)
Age: 40
End: 2020-09-28

## 2020-09-28 VITALS
HEART RATE: 82 BPM | HEIGHT: 69 IN | SYSTOLIC BLOOD PRESSURE: 115 MMHG | WEIGHT: 177 LBS | TEMPERATURE: 98.2 F | DIASTOLIC BLOOD PRESSURE: 77 MMHG | BODY MASS INDEX: 26.22 KG/M2

## 2020-09-28 DIAGNOSIS — O02.1 MISSED ABORTION: Chronic | ICD-10-CM

## 2020-09-28 LAB
HCG UR QL: NEGATIVE
QUALITY CONTROL: YES

## 2020-09-28 PROCEDURE — 99213 OFFICE O/P EST LOW 20 MIN: CPT | Mod: GC

## 2020-09-30 NOTE — PHYSICAL EXAM
[Appropriately responsive] : appropriately responsive [Alert] : alert [No Acute Distress] : no acute distress [No Murmurs] : no murmurs [Soft] : soft [Non-tender] : non-tender [Non-distended] : non-distended [No Lesions] : no lesions [Oriented x3] : oriented x3 [Examination Of The Breasts] : a normal appearance [No Masses] : no breast masses were palpable [Labia Majora] : normal [Labia Minora] : normal [Normal] : normal [Uterine Adnexae] : normal [FreeTextEntry8] : NT, no adexal masses appreciated.

## 2020-09-30 NOTE — HISTORY OF PRESENT ILLNESS
[FreeTextEntry1] : 39 yo  with a hx of PCOS who presents for her annual exam. Pt has no complaints but would like to discuss the diagnosis of PCOS and her treatment options. Pt states that she has weight loss since her last visit as a result of her recent stomach issues.  LMP was 9/3/2020. Pt periods continue to be irregular and occurs q 2-4 months, lasting 1-4 days. Denies intermenstrual periods or dysmenorrhea. \par \par Pt denies vaginal discharge or itching. Denies increased frequency or dysuria. Pt is currently in a monogamous relationship.

## 2020-10-05 DIAGNOSIS — Z00.00 ENCOUNTER FOR GENERAL ADULT MEDICAL EXAMINATION WITHOUT ABNORMAL FINDINGS: ICD-10-CM

## 2020-10-05 DIAGNOSIS — E28.2 POLYCYSTIC OVARIAN SYNDROME: ICD-10-CM

## 2020-10-10 ENCOUNTER — TRANSCRIPTION ENCOUNTER (OUTPATIENT)
Age: 40
End: 2020-10-10

## 2020-10-10 ENCOUNTER — APPOINTMENT (OUTPATIENT)
Dept: DISASTER EMERGENCY | Facility: CLINIC | Age: 40
End: 2020-10-10

## 2020-10-10 DIAGNOSIS — Z01.818 ENCOUNTER FOR OTHER PREPROCEDURAL EXAMINATION: ICD-10-CM

## 2020-10-11 LAB — SARS-COV-2 N GENE NPH QL NAA+PROBE: NOT DETECTED

## 2020-10-13 ENCOUNTER — RESULT REVIEW (OUTPATIENT)
Age: 40
End: 2020-10-13

## 2020-10-13 ENCOUNTER — OUTPATIENT (OUTPATIENT)
Dept: OUTPATIENT SERVICES | Facility: HOSPITAL | Age: 40
LOS: 1 days | Discharge: HOME | End: 2020-10-13
Payer: MEDICAID

## 2020-10-13 ENCOUNTER — APPOINTMENT (OUTPATIENT)
Dept: GASTROENTEROLOGY | Facility: HOSPITAL | Age: 40
End: 2020-10-13

## 2020-10-13 VITALS
RESPIRATION RATE: 16 BRPM | SYSTOLIC BLOOD PRESSURE: 109 MMHG | HEART RATE: 54 BPM | DIASTOLIC BLOOD PRESSURE: 68 MMHG | OXYGEN SATURATION: 100 %

## 2020-10-13 VITALS
OXYGEN SATURATION: 98 % | RESPIRATION RATE: 14 BRPM | WEIGHT: 177.03 LBS | SYSTOLIC BLOOD PRESSURE: 102 MMHG | TEMPERATURE: 99 F | HEIGHT: 69 IN | HEART RATE: 54 BPM | DIASTOLIC BLOOD PRESSURE: 72 MMHG

## 2020-10-13 DIAGNOSIS — O02.1 MISSED ABORTION: Chronic | ICD-10-CM

## 2020-10-13 DIAGNOSIS — K52.9 NONINFECTIVE GASTROENTERITIS AND COLITIS, UNSPECIFIED: ICD-10-CM

## 2020-10-13 LAB — HCG UR QL: NEGATIVE — SIGNIFICANT CHANGE UP

## 2020-10-13 PROCEDURE — 88305 TISSUE EXAM BY PATHOLOGIST: CPT | Mod: 26

## 2020-10-13 PROCEDURE — 45380 COLONOSCOPY AND BIOPSY: CPT

## 2020-10-13 PROCEDURE — 88312 SPECIAL STAINS GROUP 1: CPT | Mod: 26

## 2020-10-13 PROCEDURE — 43239 EGD BIOPSY SINGLE/MULTIPLE: CPT

## 2020-10-15 LAB — SURGICAL PATHOLOGY STUDY: SIGNIFICANT CHANGE UP

## 2020-10-22 ENCOUNTER — APPOINTMENT (OUTPATIENT)
Dept: INTERNAL MEDICINE | Facility: CLINIC | Age: 40
End: 2020-10-22
Payer: MEDICAID

## 2020-10-22 ENCOUNTER — OUTPATIENT (OUTPATIENT)
Dept: OUTPATIENT SERVICES | Facility: HOSPITAL | Age: 40
LOS: 1 days | End: 2020-10-22

## 2020-10-22 VITALS — BODY MASS INDEX: 26.22 KG/M2 | WEIGHT: 177 LBS | HEIGHT: 69 IN

## 2020-10-22 DIAGNOSIS — O02.1 MISSED ABORTION: Chronic | ICD-10-CM

## 2020-10-22 PROCEDURE — ZZZZZ: CPT

## 2020-10-22 NOTE — REVIEW OF SYSTEMS
[Chills] : chills [Recent Change In Weight] : ~T recent weight change [Abdominal Pain] : abdominal pain [Nausea] : nausea [Constipation] : constipation [Diarrhea] : diarrhea [Joint Pain] : joint pain [Insomnia] : insomnia

## 2020-10-23 DIAGNOSIS — E28.2 POLYCYSTIC OVARIAN SYNDROME: ICD-10-CM

## 2020-10-23 DIAGNOSIS — G47.9 SLEEP DISORDER, UNSPECIFIED: ICD-10-CM

## 2020-10-23 DIAGNOSIS — R10.9 UNSPECIFIED ABDOMINAL PAIN: ICD-10-CM

## 2020-10-23 DIAGNOSIS — R14.0 ABDOMINAL DISTENSION (GASEOUS): ICD-10-CM

## 2020-10-23 NOTE — ASSESSMENT
[FreeTextEntry1] : 40-year-old female presents for follow-up of gastrointestinal complaints, PCOS, sleep difficulties.\par \par #Gastrointestinal issues\par -Gastric emptying study and EGD/colonoscopy performed recently with GI, only significant for mild gastritis\par -Advised close monitoring of symptoms and close follow-up with gastroenterology\par \par #PCOS, cold intolerance\par -Patient interested in seeing endocrinology will give referral today\par -Screening for diabetes and thyroid disease earlier this year within normal limits\par \par #Sleep difficulties\par -Discussed sleep hygiene techniques with patient today, including avoiding screen time before bed, regular set bedtime and awakening time, and regular exercise\par -Will give referral for behavioral health today

## 2020-10-23 NOTE — HISTORY OF PRESENT ILLNESS
[Home] : at home, [unfilled] , at the time of the visit. [Other Location: e.g. Home (Enter Location, City,State)___] : at [unfilled] [Verbal consent obtained from patient] : the patient, [unfilled] [de-identified] : 40-year-old female presents for telephonic follow-up visit to discuss ongoing gastrointestinal issues.\par \par Patient has been having gastrointestinal issues for the past few months now.  States that her stomach always feels upset and she has been reducing food intake because of this.  She states that she has stomach issues regardless of what type of food she consumes.  She has alternating diarrhea and constipation, and cannot identify triggers or alleviating factors for either.  She also endorses abdominal cramping and bloating that occur at random times.  She is unsure if she has had any objective weight loss, but states that friends have told her it looks like she has lost weight.  She has tried devika and IBgard for her symptoms with little to no relief.\par \par Patient has had multiple tests with GI in recent past, including gastric emptying study and EGD/colonoscopy.  Gastric emptying study was normal.  EGD notable for mild gastritis and esophagitis insistent with reflux.  Colonoscopy within normal limits.  She has not had office visit to follow-up with gastroenterologist since completion of procedures.\par \par Patient also recently diagnosed with PCOS by gynecology.  Patient is concerned about hormone levels and is also concerned about diabetes as she has an extensive family history of diabetes.  So endorses cold intolerance. Patient also endorsing sleep difficulties that have been ongoing for years.  She was following up with a therapist and has taken Ambien occasionally in the past for sleep issues.  She denies any snoring, aching up gasping for breath.  He endorses very irregular sleep patterns.  She is trying to limit the amount of stress in her life and relax as much as possible.

## 2020-10-30 ENCOUNTER — APPOINTMENT (OUTPATIENT)
Dept: GASTROENTEROLOGY | Facility: CLINIC | Age: 40
End: 2020-10-30
Payer: MEDICAID

## 2020-10-30 PROCEDURE — 99214 OFFICE O/P EST MOD 30 MIN: CPT | Mod: 95

## 2020-10-30 RX ORDER — SODIUM SULFATE, POTASSIUM SULFATE, MAGNESIUM SULFATE 17.5; 3.13; 1.6 G/ML; G/ML; G/ML
17.5-3.13-1.6 SOLUTION, CONCENTRATE ORAL
Qty: 1 | Refills: 0 | Status: COMPLETED | COMMUNITY
Start: 2020-07-06 | End: 2020-10-30

## 2020-10-30 RX ORDER — POLYETHYLENE GLYCOL-3350, SODIUM CHLORIDE, POTASSIUM CHLORIDE AND SODIUM BICARBONATE 420; 11.2; 5.72; 1.48 G/438.4G; G/438.4G; G/438.4G; G/438.4G
420 POWDER, FOR SOLUTION ORAL
Qty: 1 | Refills: 0 | Status: COMPLETED | COMMUNITY
Start: 2020-07-07 | End: 2020-10-30

## 2020-11-20 ENCOUNTER — OUTPATIENT (OUTPATIENT)
Dept: OUTPATIENT SERVICES | Facility: HOSPITAL | Age: 40
LOS: 1 days | End: 2020-11-20

## 2020-11-20 ENCOUNTER — APPOINTMENT (OUTPATIENT)
Dept: INTERNAL MEDICINE | Facility: CLINIC | Age: 40
End: 2020-11-20
Payer: MEDICAID

## 2020-11-20 VITALS
BODY MASS INDEX: 26.22 KG/M2 | WEIGHT: 177 LBS | HEIGHT: 69 IN | HEART RATE: 65 BPM | SYSTOLIC BLOOD PRESSURE: 105 MMHG | DIASTOLIC BLOOD PRESSURE: 70 MMHG | OXYGEN SATURATION: 98 %

## 2020-11-20 VITALS — TEMPERATURE: 98.1 F

## 2020-11-20 DIAGNOSIS — O02.1 MISSED ABORTION: Chronic | ICD-10-CM

## 2020-11-20 PROCEDURE — 99395 PREV VISIT EST AGE 18-39: CPT

## 2020-11-20 NOTE — HEALTH RISK ASSESSMENT
[No] : In the past 12 months have you used drugs other than those required for medical reasons? No [Unemployed] : unemployed [Fully functional (bathing, dressing, toileting, transferring, walking, feeding)] : Fully functional (bathing, dressing, toileting, transferring, walking, feeding) [Fully functional (using the telephone, shopping, preparing meals, housekeeping, doing laundry, using] : Fully functional and needs no help or supervision to perform IADLs (using the telephone, shopping, preparing meals, housekeeping, doing laundry, using transportation, managing medications and managing finances) [] : No [de-identified] : no regular exercise, trying to walk more [de-identified] : limited due to GI issues, going to be limiting lactose soon [de-identified] : lives with friend [FreeTextEntry2] : not currently working -  in the past, may be switching careers

## 2020-11-20 NOTE — ASSESSMENT
[FreeTextEntry1] : 40-year-old female presents for CPE.\par \par #HCM\par -Tdap up-to-date 2020\par -Lab work reviewed as above\par -Pap smear WNL 2019\par -Declined flu shot today\par \par #GI issues\par -Continue regular follow-up with GI\par -Plan to start TCA and PPI, and limit lactose in diet\par \par #Sleep difficulties\par -BH referral given last visit, patient following up with therapist soon, discussed sleep hygiene techniques again especially limiting screen time before bedtime\par \par #PCOS\par -Given referral to make endocrinology appointment again

## 2020-11-20 NOTE — HISTORY OF PRESENT ILLNESS
[de-identified] : 40-year-old female presents for CPE.\par \par #GI issues: Patient recently followed up with gastroenterologist via telehealth for ongoing GI issues.  She was prescribed TCA and is going to start taking it soon. She was also recommended to restart PPI which she is planning to do soon.  She is also planning to eliminate lactose from her diet.  Not much has changed in terms of symptoms; she is still having daily symptoms including pain and nausea. She eats only when extremely hungry, but has not noticed weight changes in past few months.\par \par #Sleep, stress: Patient was seeing a therapist and is waiting to hear back from office to reschedule appointments.  She is still having issues with sleep and stress -she is trying to cut down on cell phone usage specifically before bed to help with both.  Behavioral health referral given last visit.\par \par #PCOS: Patient still interested in following up with endocrinology after recent diagnosis of PCOS, still having cold intolerance.  Thyroid testing earlier this year WNL.\par \par #HCM\par Tdap UTD, pap WNL 2019, CBC 3/2020 WNL, BMP 8/20 WNL, LFTs 6/20 WNL, A1c 5.4% 6.20, lipids WNL 2019

## 2020-11-20 NOTE — REVIEW OF SYSTEMS
[Abdominal Pain] : abdominal pain [Nausea] : nausea [Insomnia] : insomnia [Fever] : no fever [Chills] : no chills [Recent Change In Weight] : ~T no recent weight change [Chest Pain] : no chest pain [Palpitations] : no palpitations [Shortness Of Breath] : no shortness of breath [Cough] : no cough [Headache] : no headache [Dizziness] : no dizziness

## 2020-11-20 NOTE — PHYSICAL EXAM
[Normal] : no acute distress, well nourished, well developed and well-appearing [Normal Sclera/Conjunctiva] : normal sclera/conjunctiva [EOMI] : extraocular movements intact [Normal Outer Ear/Nose] : the outer ears and nose were normal in appearance [No Respiratory Distress] : no respiratory distress  [No Accessory Muscle Use] : no accessory muscle use [Clear to Auscultation] : lungs were clear to auscultation bilaterally [Normal Rate] : normal rate  [Regular Rhythm] : with a regular rhythm [Normal S1, S2] : normal S1 and S2 [Pedal Pulses Present] : the pedal pulses are present [No Edema] : there was no peripheral edema [Soft] : abdomen soft [Non Tender] : non-tender [Non-distended] : non-distended [Normal Bowel Sounds] : normal bowel sounds [No Joint Swelling] : no joint swelling [Grossly Normal Strength/Tone] : grossly normal strength/tone [No Rash] : no rash [No Skin Lesions] : no skin lesions [No Focal Deficits] : no focal deficits [Normal Gait] : normal gait [Normal Affect] : the affect was normal [Normal Mood] : the mood was normal

## 2020-11-23 ENCOUNTER — NON-APPOINTMENT (OUTPATIENT)
Age: 40
End: 2020-11-23

## 2020-11-23 DIAGNOSIS — R10.9 UNSPECIFIED ABDOMINAL PAIN: ICD-10-CM

## 2020-11-23 DIAGNOSIS — E28.2 POLYCYSTIC OVARIAN SYNDROME: ICD-10-CM

## 2020-11-23 DIAGNOSIS — Z00.00 ENCOUNTER FOR GENERAL ADULT MEDICAL EXAMINATION WITHOUT ABNORMAL FINDINGS: ICD-10-CM

## 2020-11-23 DIAGNOSIS — G47.9 SLEEP DISORDER, UNSPECIFIED: ICD-10-CM

## 2020-11-24 ENCOUNTER — NON-APPOINTMENT (OUTPATIENT)
Age: 40
End: 2020-11-24

## 2020-12-03 ENCOUNTER — NON-APPOINTMENT (OUTPATIENT)
Age: 40
End: 2020-12-03

## 2020-12-11 ENCOUNTER — APPOINTMENT (OUTPATIENT)
Dept: GASTROENTEROLOGY | Facility: CLINIC | Age: 40
End: 2020-12-11
Payer: MEDICAID

## 2020-12-11 VITALS
DIASTOLIC BLOOD PRESSURE: 80 MMHG | BODY MASS INDEX: 25.92 KG/M2 | OXYGEN SATURATION: 98 % | HEART RATE: 67 BPM | SYSTOLIC BLOOD PRESSURE: 120 MMHG | TEMPERATURE: 98.7 F | WEIGHT: 175 LBS | HEIGHT: 69 IN

## 2020-12-11 PROCEDURE — 99213 OFFICE O/P EST LOW 20 MIN: CPT

## 2020-12-11 PROCEDURE — 99072 ADDL SUPL MATRL&STAF TM PHE: CPT

## 2020-12-16 PROBLEM — Z86.19 HISTORY OF CANDIDIASIS OF VAGINA: Status: RESOLVED | Noted: 2018-05-02 | Resolved: 2020-12-16

## 2020-12-21 ENCOUNTER — RX RENEWAL (OUTPATIENT)
Age: 40
End: 2020-12-21

## 2021-01-11 ENCOUNTER — APPOINTMENT (OUTPATIENT)
Dept: GASTROENTEROLOGY | Facility: CLINIC | Age: 41
End: 2021-01-11

## 2021-01-17 ENCOUNTER — TRANSCRIPTION ENCOUNTER (OUTPATIENT)
Age: 41
End: 2021-01-17

## 2021-01-19 ENCOUNTER — RESULT REVIEW (OUTPATIENT)
Age: 41
End: 2021-01-19

## 2021-01-19 ENCOUNTER — OUTPATIENT (OUTPATIENT)
Dept: OUTPATIENT SERVICES | Facility: HOSPITAL | Age: 41
LOS: 1 days | End: 2021-01-19

## 2021-01-19 ENCOUNTER — APPOINTMENT (OUTPATIENT)
Dept: INTERNAL MEDICINE | Facility: CLINIC | Age: 41
End: 2021-01-19

## 2021-01-19 VITALS — TEMPERATURE: 97.5 F

## 2021-01-19 DIAGNOSIS — O02.1 MISSED ABORTION: Chronic | ICD-10-CM

## 2021-01-19 LAB
SARS-COV-2 IGG SERPL QL IA: NEGATIVE — SIGNIFICANT CHANGE UP
SARS-COV-2 IGM SERPL IA-ACNC: 0.1 INDEX — SIGNIFICANT CHANGE UP

## 2021-01-20 ENCOUNTER — NON-APPOINTMENT (OUTPATIENT)
Age: 41
End: 2021-01-20

## 2021-01-20 DIAGNOSIS — Z11.59 ENCOUNTER FOR SCREENING FOR OTHER VIRAL DISEASES: ICD-10-CM

## 2021-01-27 ENCOUNTER — RESULT REVIEW (OUTPATIENT)
Age: 41
End: 2021-01-27

## 2021-01-27 ENCOUNTER — OUTPATIENT (OUTPATIENT)
Dept: OUTPATIENT SERVICES | Facility: HOSPITAL | Age: 41
LOS: 1 days | End: 2021-01-27

## 2021-01-27 ENCOUNTER — APPOINTMENT (OUTPATIENT)
Dept: OBGYN | Facility: HOSPITAL | Age: 41
End: 2021-01-27
Payer: MEDICAID

## 2021-01-27 VITALS
WEIGHT: 175 LBS | HEART RATE: 76 BPM | BODY MASS INDEX: 25.92 KG/M2 | TEMPERATURE: 97.9 F | DIASTOLIC BLOOD PRESSURE: 68 MMHG | SYSTOLIC BLOOD PRESSURE: 111 MMHG | HEIGHT: 69 IN

## 2021-01-27 DIAGNOSIS — R30.0 DYSURIA: ICD-10-CM

## 2021-01-27 DIAGNOSIS — O02.1 MISSED ABORTION: Chronic | ICD-10-CM

## 2021-01-27 LAB
APPEARANCE UR: ABNORMAL
BACTERIA # UR AUTO: ABNORMAL
BILIRUB UR-MCNC: NEGATIVE — SIGNIFICANT CHANGE UP
COLOR SPEC: SIGNIFICANT CHANGE UP
DIFF PNL FLD: NEGATIVE — SIGNIFICANT CHANGE UP
EPI CELLS # UR: 10 /HPF — HIGH (ref 0–5)
GLUCOSE UR QL: NEGATIVE — SIGNIFICANT CHANGE UP
HIV 1+2 AB+HIV1 P24 AG SERPL QL IA: SIGNIFICANT CHANGE UP
HYALINE CASTS # UR AUTO: 1 /LPF — SIGNIFICANT CHANGE UP (ref 0–7)
KETONES UR-MCNC: NEGATIVE — SIGNIFICANT CHANGE UP
LEUKOCYTE ESTERASE UR-ACNC: NEGATIVE — SIGNIFICANT CHANGE UP
NITRITE UR-MCNC: NEGATIVE — SIGNIFICANT CHANGE UP
PH UR: 6.5 — SIGNIFICANT CHANGE UP (ref 5–8)
PROT UR-MCNC: ABNORMAL
RBC CASTS # UR COMP ASSIST: 3 /HPF — SIGNIFICANT CHANGE UP (ref 0–4)
SP GR SPEC: 1.01 — SIGNIFICANT CHANGE UP (ref 1.01–1.02)
UROBILINOGEN FLD QL: SIGNIFICANT CHANGE UP
WBC UR QL: 4 /HPF — SIGNIFICANT CHANGE UP (ref 0–5)

## 2021-01-27 PROCEDURE — 99213 OFFICE O/P EST LOW 20 MIN: CPT | Mod: GE

## 2021-01-27 RX ORDER — FLUCONAZOLE 150 MG/1
150 TABLET ORAL
Qty: 1 | Refills: 0 | Status: ACTIVE | OUTPATIENT
Start: 2021-01-27

## 2021-01-27 NOTE — COUNSELING
[Preconception Care/ Fertility options] : preconception care, fertility options [STD (testing, results, tx)] : STD (testing, results, tx)

## 2021-01-28 DIAGNOSIS — R30.0 DYSURIA: ICD-10-CM

## 2021-01-28 DIAGNOSIS — N89.8 OTHER SPECIFIED NONINFLAMMATORY DISORDERS OF VAGINA: ICD-10-CM

## 2021-01-28 LAB
CANDIDA AB TITR SER: SIGNIFICANT CHANGE UP
G VAGINALIS DNA SPEC QL NAA+PROBE: DETECTED
HPV HIGH+LOW RISK DNA PNL CVX: SIGNIFICANT CHANGE UP
N GONORRHOEA RRNA SPEC QL NAA+PROBE: SIGNIFICANT CHANGE UP
SARS-COV-2 IGG SERPL QL IA: NEGATIVE — SIGNIFICANT CHANGE UP
SARS-COV-2 IGM SERPL IA-ACNC: <0.1 INDEX — SIGNIFICANT CHANGE UP
SPECIMEN SOURCE: SIGNIFICANT CHANGE UP
T PALLIDUM AB TITR SER: NEGATIVE — SIGNIFICANT CHANGE UP
T VAGINALIS SPEC QL WET PREP: SIGNIFICANT CHANGE UP

## 2021-01-29 ENCOUNTER — APPOINTMENT (OUTPATIENT)
Dept: GASTROENTEROLOGY | Facility: CLINIC | Age: 41
End: 2021-01-29
Payer: MEDICAID

## 2021-01-29 ENCOUNTER — NON-APPOINTMENT (OUTPATIENT)
Age: 41
End: 2021-01-29

## 2021-01-29 VITALS
TEMPERATURE: 97.6 F | OXYGEN SATURATION: 98 % | WEIGHT: 175 LBS | HEART RATE: 62 BPM | SYSTOLIC BLOOD PRESSURE: 110 MMHG | BODY MASS INDEX: 25.92 KG/M2 | DIASTOLIC BLOOD PRESSURE: 70 MMHG | HEIGHT: 69 IN

## 2021-01-29 DIAGNOSIS — K52.9 NONINFECTIVE GASTROENTERITIS AND COLITIS, UNSPECIFIED: ICD-10-CM

## 2021-01-29 DIAGNOSIS — R14.0 ABDOMINAL DISTENSION (GASEOUS): ICD-10-CM

## 2021-01-29 DIAGNOSIS — B96.89 ACUTE VAGINITIS: ICD-10-CM

## 2021-01-29 DIAGNOSIS — N76.0 ACUTE VAGINITIS: ICD-10-CM

## 2021-01-29 LAB
CULTURE RESULTS: SIGNIFICANT CHANGE UP
HCG UR QL: NEGATIVE
QUALITY CONTROL: YES
SPECIMEN SOURCE: SIGNIFICANT CHANGE UP

## 2021-01-29 PROCEDURE — 99072 ADDL SUPL MATRL&STAF TM PHE: CPT

## 2021-01-29 PROCEDURE — 99213 OFFICE O/P EST LOW 20 MIN: CPT

## 2021-01-29 RX ORDER — METRONIDAZOLE 7.5 MG/G
0.75 GEL VAGINAL
Qty: 1 | Refills: 0 | Status: ACTIVE | COMMUNITY
Start: 2021-01-29 | End: 1900-01-01

## 2021-01-29 RX ORDER — SIMETHICONE 125 MG
125 CAPSULE ORAL 4 TIMES DAILY
Qty: 60 | Refills: 2 | Status: ACTIVE | COMMUNITY
Start: 2021-01-29 | End: 1900-01-01

## 2021-01-29 NOTE — PHYSICAL EXAM
[Appropriately responsive] : appropriately responsive [Soft] : soft [Non-tender] : non-tender [No Mass] : no mass [Oriented x3] : oriented x3 [Normal] : normal external genitalia [Labia Majora] : normal [Heavy] : heavy [No Bleeding] : There was no active vaginal bleeding [Discharge] : discharge [Moderate] : moderate [White] : white [Thick] : thick [Normal Position] : in a normal position [Uterine Adnexae] : normal [FreeTextEntry7] : No CVA tenderness [FreeTextEntry5] : No cervical motion tenderness [FreeTextEntry8] : No CMT

## 2021-01-29 NOTE — REVIEW OF SYSTEMS
[Urgency] : urgency [Frequency] : frequency [Dysuria] : dysuria [Urethral Discharge] : urethral discharge [Abn Vaginal bleeding] : abnormal vaginal bleeding [Pelvic pain] : pelvic pain [Negative] : Heme/Lymph [Fever] : no fever [Chills] : no chills [Abdominal Pain] : no abdominal pain [Vomiting] : no vomiting [Incontinence] : no incontinence [CVA Pain] : no CVA pain

## 2021-02-02 LAB — CYTOLOGY SPEC DOC CYTO: SIGNIFICANT CHANGE UP

## 2021-02-07 ENCOUNTER — TRANSCRIPTION ENCOUNTER (OUTPATIENT)
Age: 41
End: 2021-02-07

## 2021-03-05 ENCOUNTER — OUTPATIENT (OUTPATIENT)
Dept: OUTPATIENT SERVICES | Facility: HOSPITAL | Age: 41
LOS: 1 days | End: 2021-03-05

## 2021-03-05 ENCOUNTER — RESULT REVIEW (OUTPATIENT)
Age: 41
End: 2021-03-05

## 2021-03-05 ENCOUNTER — APPOINTMENT (OUTPATIENT)
Dept: INTERNAL MEDICINE | Facility: CLINIC | Age: 41
End: 2021-03-05
Payer: MEDICAID

## 2021-03-05 VITALS
BODY MASS INDEX: 25.77 KG/M2 | SYSTOLIC BLOOD PRESSURE: 115 MMHG | OXYGEN SATURATION: 98 % | HEART RATE: 65 BPM | WEIGHT: 174 LBS | HEIGHT: 69 IN | DIASTOLIC BLOOD PRESSURE: 70 MMHG

## 2021-03-05 VITALS — TEMPERATURE: 97.1 F

## 2021-03-05 DIAGNOSIS — O02.1 MISSED ABORTION: Chronic | ICD-10-CM

## 2021-03-05 DIAGNOSIS — E28.2 POLYCYSTIC OVARIAN SYNDROME: ICD-10-CM

## 2021-03-05 DIAGNOSIS — U07.1 COVID-19: ICD-10-CM

## 2021-03-05 LAB
ALBUMIN SERPL ELPH-MCNC: 4.6 G/DL — SIGNIFICANT CHANGE UP (ref 3.3–5)
ALP SERPL-CCNC: 61 U/L — SIGNIFICANT CHANGE UP (ref 40–120)
ALT FLD-CCNC: 17 U/L — SIGNIFICANT CHANGE UP (ref 4–33)
ANION GAP SERPL CALC-SCNC: 14 MMOL/L — SIGNIFICANT CHANGE UP (ref 7–14)
AST SERPL-CCNC: 14 U/L — SIGNIFICANT CHANGE UP (ref 4–32)
BASOPHILS # BLD AUTO: 0.02 K/UL — SIGNIFICANT CHANGE UP (ref 0–0.2)
BASOPHILS NFR BLD AUTO: 0.4 % — SIGNIFICANT CHANGE UP (ref 0–2)
BILIRUB SERPL-MCNC: 0.9 MG/DL — SIGNIFICANT CHANGE UP (ref 0.2–1.2)
BUN SERPL-MCNC: 8 MG/DL — SIGNIFICANT CHANGE UP (ref 7–23)
CALCIUM SERPL-MCNC: 9.6 MG/DL — SIGNIFICANT CHANGE UP (ref 8.4–10.5)
CHLORIDE SERPL-SCNC: 106 MMOL/L — SIGNIFICANT CHANGE UP (ref 98–107)
CO2 SERPL-SCNC: 22 MMOL/L — SIGNIFICANT CHANGE UP (ref 22–31)
CREAT SERPL-MCNC: 0.75 MG/DL — SIGNIFICANT CHANGE UP (ref 0.5–1.3)
EOSINOPHIL # BLD AUTO: 0.05 K/UL — SIGNIFICANT CHANGE UP (ref 0–0.5)
EOSINOPHIL NFR BLD AUTO: 1 % — SIGNIFICANT CHANGE UP (ref 0–6)
GLUCOSE SERPL-MCNC: 103 MG/DL — HIGH (ref 70–99)
HCT VFR BLD CALC: 41.6 % — SIGNIFICANT CHANGE UP (ref 34.5–45)
HGB BLD-MCNC: 13.5 G/DL — SIGNIFICANT CHANGE UP (ref 11.5–15.5)
IANC: 3.1 K/UL — SIGNIFICANT CHANGE UP (ref 1.5–8.5)
IMM GRANULOCYTES NFR BLD AUTO: 0.4 % — SIGNIFICANT CHANGE UP (ref 0–1.5)
LYMPHOCYTES # BLD AUTO: 1.47 K/UL — SIGNIFICANT CHANGE UP (ref 1–3.3)
LYMPHOCYTES # BLD AUTO: 29.6 % — SIGNIFICANT CHANGE UP (ref 13–44)
MCHC RBC-ENTMCNC: 29.7 PG — SIGNIFICANT CHANGE UP (ref 27–34)
MCHC RBC-ENTMCNC: 32.5 GM/DL — SIGNIFICANT CHANGE UP (ref 32–36)
MCV RBC AUTO: 91.6 FL — SIGNIFICANT CHANGE UP (ref 80–100)
MONOCYTES # BLD AUTO: 0.31 K/UL — SIGNIFICANT CHANGE UP (ref 0–0.9)
MONOCYTES NFR BLD AUTO: 6.2 % — SIGNIFICANT CHANGE UP (ref 2–14)
NEUTROPHILS # BLD AUTO: 3.1 K/UL — SIGNIFICANT CHANGE UP (ref 1.8–7.4)
NEUTROPHILS NFR BLD AUTO: 62.4 % — SIGNIFICANT CHANGE UP (ref 43–77)
NRBC # BLD: 0 /100 WBCS — SIGNIFICANT CHANGE UP
NRBC # FLD: 0 K/UL — SIGNIFICANT CHANGE UP
PLATELET # BLD AUTO: 278 K/UL — SIGNIFICANT CHANGE UP (ref 150–400)
POTASSIUM SERPL-MCNC: 3.8 MMOL/L — SIGNIFICANT CHANGE UP (ref 3.5–5.3)
POTASSIUM SERPL-SCNC: 3.8 MMOL/L — SIGNIFICANT CHANGE UP (ref 3.5–5.3)
PROT SERPL-MCNC: 7.7 G/DL — SIGNIFICANT CHANGE UP (ref 6–8.3)
RBC # BLD: 4.54 M/UL — SIGNIFICANT CHANGE UP (ref 3.8–5.2)
RBC # FLD: 12.4 % — SIGNIFICANT CHANGE UP (ref 10.3–14.5)
SODIUM SERPL-SCNC: 142 MMOL/L — SIGNIFICANT CHANGE UP (ref 135–145)
WBC # BLD: 4.97 K/UL — SIGNIFICANT CHANGE UP (ref 3.8–10.5)
WBC # FLD AUTO: 4.97 K/UL — SIGNIFICANT CHANGE UP (ref 3.8–10.5)

## 2021-03-05 PROCEDURE — 99214 OFFICE O/P EST MOD 30 MIN: CPT

## 2021-03-05 NOTE — PHYSICAL EXAM
[Normal] : no acute distress, well nourished, well developed and well-appearing [Normal Sclera/Conjunctiva] : normal sclera/conjunctiva [EOMI] : extraocular movements intact [Normal Outer Ear/Nose] : the outer ears and nose were normal in appearance [No Respiratory Distress] : no respiratory distress  [No Accessory Muscle Use] : no accessory muscle use [Clear to Auscultation] : lungs were clear to auscultation bilaterally [Normal Rate] : normal rate  [Regular Rhythm] : with a regular rhythm [Normal S1, S2] : normal S1 and S2 [No Edema] : there was no peripheral edema [Soft] : abdomen soft [Non Tender] : non-tender [Non-distended] : non-distended [Normal Bowel Sounds] : normal bowel sounds [No Joint Swelling] : no joint swelling [Grossly Normal Strength/Tone] : grossly normal strength/tone [No Rash] : no rash [No Skin Lesions] : no skin lesions [No Focal Deficits] : no focal deficits [Normal Gait] : normal gait [Normal Affect] : the affect was normal [Normal Mood] : the mood was normal [Normal Oropharynx] : the oropharynx was normal

## 2021-03-05 NOTE — REVIEW OF SYSTEMS
[Fatigue] : fatigue [Insomnia] : insomnia [Fever] : no fever [Chills] : no chills [Chest Pain] : no chest pain [Palpitations] : no palpitations [Shortness Of Breath] : no shortness of breath [Cough] : no cough [Nausea] : no nausea [Constipation] : no constipation [Diarrhea] : diarrhea [Vomiting] : no vomiting

## 2021-03-05 NOTE — HISTORY OF PRESENT ILLNESS
[de-identified] : 40-year-old female presents for follow up of GI issues, sleep issues, PCOS. Patient also recently had COVID - diagnosed on 2/7/21.\par \par #COVID: Patient diagnosed on 2/7/21 - was having bad headache, fatigue, chills, SOB. Symptoms lasted for about 2-3 weeks and she is almost entirely improved at this point (but her sense of smell/taste has not entirely returned). She was using vitamins (vitamin C, zinc, vitamin D) and trying to stay well hydrated during course.\par \par #GI issues: She is currently taking TCA and PPI and feels that her symptoms have improved with these medications. She has GI follow up scheduled in May.\par \par #Sleep issues: Patient states that she was told by a friend that she gasps in her sleep and snores, and she was concerned about sleep apnea with these findings. She has been having issues with sleep for a while at this point.\par \par #PCOS: Patient followed up with gynecology 1/2021. She has an appointment scheduled with endocrinology next month.\par \par #HCM\par Tdap UTD, pap WNL 1/2021, CBC 3/2020 WNL, BMP 8/20 WNL, LFTs 6/20 WNL, A1c 5.4% 6.20, lipids WNL 2019

## 2021-03-05 NOTE — ASSESSMENT
[FreeTextEntry1] : 40-year-old female presents for follow up of GI issues, sleep difficulties, PCOS, recent COVID infection.\par \par #COVID infection\par -Symptoms mostly resolved\par -Will check CBC, CMP\par -Will repeat COVID antibody testing today\par -Discussed vaccination with patient as well today\par \par #HCM\par -Tdap up-to-date 2020\par -Pap smear WNL 2021\par \par #GI issues\par -Patient currently taking TCA and PPI per GI\par \par #Sleep difficulties\par -Will order sleep study based on witnessed snoring and gasping episodes\par \par #PCOS\par -Given referral to make endocrinology appointment at last visit - has appointment next month\par \par **Follow up after sleep study performed

## 2021-03-06 LAB
SARS-COV-2 IGG SERPL QL IA: POSITIVE
SARS-COV-2 IGM SERPL IA-ACNC: 32.2 INDEX — HIGH

## 2021-03-08 DIAGNOSIS — R10.9 UNSPECIFIED ABDOMINAL PAIN: ICD-10-CM

## 2021-03-08 DIAGNOSIS — G47.9 SLEEP DISORDER, UNSPECIFIED: ICD-10-CM

## 2021-03-08 DIAGNOSIS — E28.2 POLYCYSTIC OVARIAN SYNDROME: ICD-10-CM

## 2021-03-08 DIAGNOSIS — U07.1 COVID-19: ICD-10-CM

## 2021-04-26 ENCOUNTER — RX RENEWAL (OUTPATIENT)
Age: 41
End: 2021-04-26

## 2021-04-28 ENCOUNTER — APPOINTMENT (OUTPATIENT)
Dept: PULMONOLOGY | Facility: CLINIC | Age: 41
End: 2021-04-28

## 2021-05-24 ENCOUNTER — APPOINTMENT (OUTPATIENT)
Dept: GASTROENTEROLOGY | Facility: CLINIC | Age: 41
End: 2021-05-24
Payer: MEDICAID

## 2021-05-24 VITALS
WEIGHT: 173 LBS | SYSTOLIC BLOOD PRESSURE: 118 MMHG | HEART RATE: 67 BPM | DIASTOLIC BLOOD PRESSURE: 80 MMHG | TEMPERATURE: 98.3 F | RESPIRATION RATE: 16 BRPM | HEIGHT: 69 IN | BODY MASS INDEX: 25.62 KG/M2 | OXYGEN SATURATION: 98 %

## 2021-05-24 DIAGNOSIS — R10.9 UNSPECIFIED ABDOMINAL PAIN: ICD-10-CM

## 2021-05-24 DIAGNOSIS — K58.0 IRRITABLE BOWEL SYNDROME WITH DIARRHEA: ICD-10-CM

## 2021-05-24 DIAGNOSIS — G47.9 SLEEP DISORDER, UNSPECIFIED: ICD-10-CM

## 2021-05-24 PROCEDURE — 99214 OFFICE O/P EST MOD 30 MIN: CPT

## 2021-05-24 RX ORDER — AMITRIPTYLINE HYDROCHLORIDE 10 MG/1
10 TABLET, FILM COATED ORAL
Qty: 30 | Refills: 0 | Status: DISCONTINUED | COMMUNITY
Start: 2020-10-30 | End: 2021-05-24

## 2021-05-24 RX ORDER — AMITRIPTYLINE HYDROCHLORIDE 25 MG/1
25 TABLET, FILM COATED ORAL
Qty: 30 | Refills: 6 | Status: ACTIVE | COMMUNITY
Start: 2020-12-11 | End: 1900-01-01

## 2021-07-06 ENCOUNTER — OUTPATIENT (OUTPATIENT)
Dept: OUTPATIENT SERVICES | Facility: HOSPITAL | Age: 41
LOS: 1 days | End: 2021-07-06

## 2021-07-06 ENCOUNTER — RESULT REVIEW (OUTPATIENT)
Age: 41
End: 2021-07-06

## 2021-07-06 ENCOUNTER — APPOINTMENT (OUTPATIENT)
Dept: OBGYN | Facility: HOSPITAL | Age: 41
End: 2021-07-06
Payer: MEDICAID

## 2021-07-06 VITALS
HEART RATE: 72 BPM | BODY MASS INDEX: 25.92 KG/M2 | TEMPERATURE: 98.2 F | WEIGHT: 175 LBS | DIASTOLIC BLOOD PRESSURE: 64 MMHG | SYSTOLIC BLOOD PRESSURE: 106 MMHG | HEIGHT: 69 IN

## 2021-07-06 DIAGNOSIS — O02.1 MISSED ABORTION: Chronic | ICD-10-CM

## 2021-07-06 DIAGNOSIS — Z30.9 ENCOUNTER FOR CONTRACEPTIVE MANAGEMENT, UNSPECIFIED: ICD-10-CM

## 2021-07-06 DIAGNOSIS — E74.39 OTHER DISORDERS OF INTESTINAL CARBOHYDRATE ABSORPTION: ICD-10-CM

## 2021-07-06 DIAGNOSIS — Z30.09 ENCOUNTER FOR OTHER GENERAL COUNSELING AND ADVICE ON CONTRACEPTION: ICD-10-CM

## 2021-07-06 LAB
A1C WITH ESTIMATED AVERAGE GLUCOSE RESULT: 5.3 % — SIGNIFICANT CHANGE UP (ref 4–5.6)
ESTIMATED AVERAGE GLUCOSE: 105 — SIGNIFICANT CHANGE UP
HIV 1+2 AB+HIV1 P24 AG SERPL QL IA: SIGNIFICANT CHANGE UP
T4 FREE+ TSH PNL SERPL: 0.65 UIU/ML — SIGNIFICANT CHANGE UP (ref 0.27–4.2)

## 2021-07-06 PROCEDURE — 99213 OFFICE O/P EST LOW 20 MIN: CPT | Mod: GC

## 2021-07-06 RX ORDER — LEVONORGESTREL 1.5 MG/1
1.5 TABLET ORAL
Qty: 1 | Refills: 0 | Status: ACTIVE | COMMUNITY
Start: 2021-07-06 | End: 1900-01-01

## 2021-07-06 RX ORDER — ETONOGESTREL AND ETHINYL ESTRADIOL 11.7; 2.7 MG/1; MG/1
0.12-0.015 INSERT, EXTENDED RELEASE VAGINAL
Qty: 1 | Refills: 3 | Status: ACTIVE | COMMUNITY
Start: 2021-07-06 | End: 1900-01-01

## 2021-07-06 NOTE — PHYSICAL EXAM
[Appropriately responsive] : appropriately responsive [Alert] : alert [No Acute Distress] : no acute distress [Labia Majora] : normal [Normal] : normal

## 2021-07-07 DIAGNOSIS — Z30.09 ENCOUNTER FOR OTHER GENERAL COUNSELING AND ADVICE ON CONTRACEPTION: ICD-10-CM

## 2021-07-07 DIAGNOSIS — Z30.9 ENCOUNTER FOR CONTRACEPTIVE MANAGEMENT, UNSPECIFIED: ICD-10-CM

## 2021-07-07 DIAGNOSIS — E74.39 OTHER DISORDERS OF INTESTINAL CARBOHYDRATE ABSORPTION: ICD-10-CM

## 2021-07-07 LAB
C TRACH RRNA SPEC QL NAA+PROBE: SIGNIFICANT CHANGE UP
CANDIDA AB TITR SER: SIGNIFICANT CHANGE UP
G VAGINALIS DNA SPEC QL NAA+PROBE: DETECTED
HBV SURFACE AG SER-ACNC: SIGNIFICANT CHANGE UP
N GONORRHOEA RRNA SPEC QL NAA+PROBE: SIGNIFICANT CHANGE UP
SPECIMEN SOURCE: SIGNIFICANT CHANGE UP
T PALLIDUM AB TITR SER: NEGATIVE — SIGNIFICANT CHANGE UP
T VAGINALIS SPEC QL WET PREP: SIGNIFICANT CHANGE UP

## 2021-07-08 RX ORDER — METRONIDAZOLE 7.5 MG/G
0.75 GEL VAGINAL
Qty: 1 | Refills: 0 | Status: ACTIVE | COMMUNITY
Start: 2021-02-02 | End: 1900-01-01

## 2021-07-08 NOTE — HISTORY OF PRESENT ILLNESS
[FreeTextEntry1] : 40 y/o  LMP  presents for annual exam. Patient states she had unprotected intercourse yesterday am. \par She requests plan B and STI testing. Patient also requests hromonal testing for history of PCOS. She also states she has a yeast infection which is refractory to monistat. \par \par PMhx: PCOS, IBS\par OB:   uncomplicated\par GYN: LMP , irregular periods. Pap  neg\par PSH: s/p Cone biopsy , ovarian cystectomy\par All: NKDA\par Sochx: Denies toxic habits, anxiety/depression\par Famhx: GI malignancy

## 2021-07-08 NOTE — PLAN
[FreeTextEntry1] : 40 y/o  LMP  presents for annual exam, with acute complaints of requesting plan B, treatment of Candida and contraception\par \par \par #Unprotected [place]\par - Pregnancy test neg\par - Plan B prescribed\par - STD panel ordered\par \par #HCM\par - patient requested A1c\par - Patient requesting covid antibodies, and thyroid hormone\par - Pap 2021 NILM \par \par # Contraception\par - Patient  counseled on options\par - Declines IUDs, Depo, pills\par - agreed to try Nuva ring \par \par J Catina PGY4\par d/w Dr. Maria

## 2021-08-27 ENCOUNTER — RX RENEWAL (OUTPATIENT)
Age: 41
End: 2021-08-27

## 2021-08-27 RX ORDER — OMEPRAZOLE 20 MG/1
20 CAPSULE, DELAYED RELEASE ORAL DAILY
Qty: 30 | Refills: 3 | Status: ACTIVE | COMMUNITY
Start: 2020-10-30 | End: 1900-01-01

## 2021-10-07 ENCOUNTER — APPOINTMENT (OUTPATIENT)
Dept: ENDOCRINOLOGY | Facility: CLINIC | Age: 41
End: 2021-10-07

## 2021-10-12 RX ORDER — METRONIDAZOLE 7.5 MG/G
0.75 GEL VAGINAL
Qty: 1 | Refills: 1 | Status: ACTIVE | COMMUNITY
Start: 2021-10-12 | End: 1900-01-01

## 2021-11-29 ENCOUNTER — APPOINTMENT (OUTPATIENT)
Dept: GASTROENTEROLOGY | Facility: CLINIC | Age: 41
End: 2021-11-29

## 2021-12-02 NOTE — ED ADULT NURSE NOTE - RESPIRATORY RATE (BREATHS/MIN)
16 Solaraze Counseling:  I discussed with the patient the risks of Solaraze including but not limited to erythema, scaling, itching, weeping, crusting, and pain.

## 2022-02-09 ENCOUNTER — RESULT REVIEW (OUTPATIENT)
Age: 42
End: 2022-02-09

## 2022-02-09 ENCOUNTER — APPOINTMENT (OUTPATIENT)
Dept: OBGYN | Facility: HOSPITAL | Age: 42
End: 2022-02-09
Payer: MEDICAID

## 2022-02-09 ENCOUNTER — OUTPATIENT (OUTPATIENT)
Dept: OUTPATIENT SERVICES | Facility: HOSPITAL | Age: 42
LOS: 1 days | End: 2022-02-09

## 2022-02-09 VITALS
HEIGHT: 69 IN | SYSTOLIC BLOOD PRESSURE: 116 MMHG | BODY MASS INDEX: 26.36 KG/M2 | WEIGHT: 178 LBS | HEART RATE: 72 BPM | DIASTOLIC BLOOD PRESSURE: 64 MMHG

## 2022-02-09 DIAGNOSIS — O02.1 MISSED ABORTION: Chronic | ICD-10-CM

## 2022-02-09 DIAGNOSIS — Z00.00 ENCOUNTER FOR GENERAL ADULT MEDICAL EXAMINATION W/OUT ABNORMAL FINDINGS: ICD-10-CM

## 2022-02-09 LAB
A1C WITH ESTIMATED AVERAGE GLUCOSE RESULT: 5.4 % — SIGNIFICANT CHANGE UP (ref 4–5.6)
ESTIMATED AVERAGE GLUCOSE: 108 — SIGNIFICANT CHANGE UP
HIV 1+2 AB+HIV1 P24 AG SERPL QL IA: SIGNIFICANT CHANGE UP

## 2022-02-09 PROCEDURE — 99395 PREV VISIT EST AGE 18-39: CPT | Mod: GC

## 2022-02-10 DIAGNOSIS — Z01.419 ENCOUNTER FOR GYNECOLOGICAL EXAMINATION (GENERAL) (ROUTINE) WITHOUT ABNORMAL FINDINGS: ICD-10-CM

## 2022-02-10 DIAGNOSIS — Z00.00 ENCOUNTER FOR GENERAL ADULT MEDICAL EXAMINATION WITHOUT ABNORMAL FINDINGS: ICD-10-CM

## 2022-02-10 LAB
C TRACH RRNA SPEC QL NAA+PROBE: SIGNIFICANT CHANGE UP
COVID-19 SPIKE DOMAIN AB INTERP: POSITIVE
COVID-19 SPIKE DOMAIN ANTIBODY RESULT: >250 U/ML — HIGH
N GONORRHOEA RRNA SPEC QL NAA+PROBE: SIGNIFICANT CHANGE UP
SARS-COV-2 IGG+IGM SERPL QL IA: >250 U/ML — HIGH
SARS-COV-2 IGG+IGM SERPL QL IA: POSITIVE
SPECIMEN SOURCE: SIGNIFICANT CHANGE UP
T PALLIDUM AB TITR SER: NEGATIVE — SIGNIFICANT CHANGE UP

## 2022-02-10 NOTE — HISTORY OF PRESENT ILLNESS
[Patient reported PAP Smear was normal] : Patient reported PAP Smear was normal [HIV Test offered] : HIV Test offered [Syphilis test offered] : Syphilis test offered [Gonorrhea test offered] : Gonorrhea test offered [Chlamydia test offered] : Chlamydia test offered [Y] : Patient is sexually active [FreeTextEntry1] : 40 yo  LMP  presenting to clinic for annual gyn exam.  Patient reports feeling well, no acute complaints at this time.  At last visit patient complaining of refractory yeast infection, no longer a complaint at this time.  Menses irregular due to long history of PCOS.  Menses unpredictable, usually 5 days, heavy x2 days then lighten.  Denies pelvic pain, abdominal pain, abnormal uterine bleeding, discharge, changes in urination or bowel movement.  Currently sexually active w/ 1 partner, uses nuvaring.  Denies dyspareunia or sexual dysfunction. Patient feels safe at home, uses a seat belt driving, and sun block in summer.  She has a PCP that she follows with regularly.\par \par PMH: PCOS, IBS\par OB: ,  in  uncomplicated\par GYN: LMP: 2021, pt reports irregular periods ranging between a few months and a year. Hx of PCOS. Pap in 2019 negative for intraepithelial lesions (hx of abnormal pap in early ). Pt currently sexually active with one male partner. Pt has a hx of Bacterial vaginosis and yeast infections. Pt not currently on birth control \par PSH: s/p Cone biopsy in , Ovarian cystectomy\par All: denies\par Medication: none\par Social hx: denies toxic habits, anxiety and depression\par Fam hx: GI malignancy [PapSmeardate] : 1/2021 [TextBox_31] : HPV- [LMPDate] : 1/14/2021

## 2022-02-10 NOTE — PHYSICAL EXAM
[Chaperone Present] : A chaperone was present in the examining room during all aspects of the physical examination [Appropriately responsive] : appropriately responsive [Regular Rate Rhythm] : regular rate rhythm [Clear to Auscultation B/L] : clear to auscultation bilaterally [Soft] : soft [Non-tender] : non-tender [Non-distended] : non-distended [No Lesions] : no lesions [Oriented x3] : oriented x3 [No Masses] : no breast masses were palpable [Labia Majora] : normal [Labia Minora] : normal [Normal] : normal [Retroversion] : retroverted [FreeTextEntry6] : wnl

## 2022-02-10 NOTE — PLAN
[FreeTextEntry1] : 40 yo  LMP 1/15 presenting for annual exam, no complaints at this time\par \par #Health Maintenance\par -pap 2021 NILM, HPV-\par -patient requesting A1c due to hx of pre-diabetes\par -requesting covid abx, recent infection \par \par #contraception\par -patient plans to continue w/ nuvaring, offered refills, states she has available\par -patient counseled on alternative options for contraception w/ hx of PCOS, declines at this time\par \par #unprotected intercourse\par -STD panel ordered\par \par follow up for annual in 1 year or sooner PRN\par \par d/w Dr. Graves\par Gustavo PGY3

## 2022-04-05 ENCOUNTER — RESULT REVIEW (OUTPATIENT)
Age: 42
End: 2022-04-05

## 2022-04-05 ENCOUNTER — APPOINTMENT (OUTPATIENT)
Dept: OBGYN | Facility: HOSPITAL | Age: 42
End: 2022-04-05

## 2022-04-05 ENCOUNTER — OUTPATIENT (OUTPATIENT)
Dept: OUTPATIENT SERVICES | Facility: HOSPITAL | Age: 42
LOS: 1 days | End: 2022-04-05

## 2022-04-05 VITALS
BODY MASS INDEX: 24.59 KG/M2 | WEIGHT: 166 LBS | HEIGHT: 69 IN | SYSTOLIC BLOOD PRESSURE: 118 MMHG | DIASTOLIC BLOOD PRESSURE: 64 MMHG | TEMPERATURE: 97.9 F | HEART RATE: 67 BPM

## 2022-04-05 DIAGNOSIS — O02.1 MISSED ABORTION: Chronic | ICD-10-CM

## 2022-04-05 DIAGNOSIS — Z30.430 ENCOUNTER FOR INSERTION OF INTRAUTERINE CONTRACEPTIVE DEVICE: ICD-10-CM

## 2022-04-05 LAB
HCG UR QL: NEGATIVE
HIV 1+2 AB+HIV1 P24 AG SERPL QL IA: SIGNIFICANT CHANGE UP
QUALITY CONTROL: YES

## 2022-04-05 RX ORDER — IBUPROFEN 600 MG/1
600 TABLET, FILM COATED ORAL
Qty: 0 | Refills: 0 | Status: COMPLETED | OUTPATIENT
Start: 2022-04-05

## 2022-04-05 NOTE — PROCEDURE
[IUD Placement] : intrauterine device (IUD) placement [Time out performed] : Pre-procedure time out performed.  Patient's name, date of birth and procedure confirmed. [Consent Obtained] : Consent obtained [Prevention of Pregnancy] : prevention of pregnancy [Emergency Contraception] : emergency contraception [Risks] : risks [Benefits] : benefits [Alternatives] : alternatives [Patient] : patient [Infection] : infection [Bleeding] : bleeding [Pain] : pain [Expulsion] : expulsion [Failure] : failure [Uterine Perforation] : uterine perforation [Neg Pregnancy Test] : negative pregnancy test [Neg GC/Chlamydia] : negative GC/Chlamydia [History of Unprotected Mehama] : history of unprotected intercourse [No Premedication] : No premedication [Betadine] : Betadine [Tenaculum] : Tenaculum [Easy Passage] : Easy passage [Sounded to ___ cm] : sounded to [unfilled] ~Ucm [Post Placement Transvag. US] : post placement transvaginal ultrasound [ParaGard IUD] : ParaGard IUD [Tolerated Well] : Patient tolerated the procedure well [No Complications] : No complications [None] : None [de-identified] : reports unprotected intercourse 4/1/22 [de-identified] : 4/1/22 [LMPDate] : 3/19/22 [de-identified] : [id number] [de-identified] : 4/2027

## 2022-04-06 DIAGNOSIS — Z30.430 ENCOUNTER FOR INSERTION OF INTRAUTERINE CONTRACEPTIVE DEVICE: ICD-10-CM

## 2022-04-06 LAB
C TRACH RRNA SPEC QL NAA+PROBE: SIGNIFICANT CHANGE UP
HBV SURFACE AG SER-ACNC: SIGNIFICANT CHANGE UP
HCV AB S/CO SERPL IA: 0.13 S/CO — SIGNIFICANT CHANGE UP (ref 0–0.99)
HCV AB SERPL-IMP: SIGNIFICANT CHANGE UP
N GONORRHOEA RRNA SPEC QL NAA+PROBE: SIGNIFICANT CHANGE UP
SPECIMEN SOURCE: SIGNIFICANT CHANGE UP
T PALLIDUM AB TITR SER: NEGATIVE — SIGNIFICANT CHANGE UP

## 2022-04-11 PROBLEM — Z11.59 SCREENING FOR VIRAL DISEASE: Status: ACTIVE | Noted: 2020-08-14

## 2022-05-05 ENCOUNTER — OUTPATIENT (OUTPATIENT)
Dept: OUTPATIENT SERVICES | Facility: HOSPITAL | Age: 42
LOS: 1 days | End: 2022-05-05

## 2022-05-05 ENCOUNTER — RESULT REVIEW (OUTPATIENT)
Age: 42
End: 2022-05-05

## 2022-05-05 ENCOUNTER — APPOINTMENT (OUTPATIENT)
Dept: OBGYN | Facility: HOSPITAL | Age: 42
End: 2022-05-05

## 2022-05-05 VITALS
HEIGHT: 69 IN | SYSTOLIC BLOOD PRESSURE: 119 MMHG | WEIGHT: 171.4 LBS | TEMPERATURE: 97.6 F | BODY MASS INDEX: 25.39 KG/M2 | DIASTOLIC BLOOD PRESSURE: 71 MMHG | HEART RATE: 69 BPM

## 2022-05-05 DIAGNOSIS — N89.8 OTHER SPECIFIED NONINFLAMMATORY DISORDERS OF VAGINA: ICD-10-CM

## 2022-05-05 DIAGNOSIS — O02.1 MISSED ABORTION: Chronic | ICD-10-CM

## 2022-05-05 LAB
HCG SERPL-ACNC: <5 MIU/ML — SIGNIFICANT CHANGE UP
HIV 1+2 AB+HIV1 P24 AG SERPL QL IA: SIGNIFICANT CHANGE UP

## 2022-05-05 NOTE — DISCUSSION/SUMMARY
[FreeTextEntry1] : 40 yo  presents for string check following IUD insertion 4/5/22 with suspicion for STI exposure. \par \par 1. IUD check\par -doing well on paragard, strings visualized at appropriate length at os\par -HCG today as paragard inserted as emergency contraception \par \par 2. STI exposure\par -STI labs repeated today, states last intercourse with this partner was 3 weeks ago so reviewed incubation periods and recommended repeating syphilis and HIV in ~2 months\par -affirm today\par \par RTC 1 year for annual exam or prn

## 2022-05-05 NOTE — HISTORY OF PRESENT ILLNESS
[FreeTextEntry1] : 42 yo  LMP 3 weeks ago presents for IUD check. S/p paragard insertion 4/5/22, placed for emergency contraception. Doing well with IUD, had a normal period following insertion. States she would like STI testing today as her partner told her that he was exposed to an STI. He didn’t specify what he was exposed to or if he had symptoms. She had negative testing at the time of her IUD insertion and 2/2022. \par she also states she had a yeast infection which she treated with several over the counter options, but states her sx are not fully resolved. \par

## 2022-05-05 NOTE — PHYSICAL EXAM
[Chaperone Present] : A chaperone was present in the examining room during all aspects of the physical examination [Labia Majora] : normal [Labia Minora] : normal [IUD String] : an IUD string was noted [Normal] : normal [Uterine Adnexae] : normal

## 2022-05-06 DIAGNOSIS — Z30.431 ENCOUNTER FOR ROUTINE CHECKING OF INTRAUTERINE CONTRACEPTIVE DEVICE: ICD-10-CM

## 2022-05-06 DIAGNOSIS — N89.8 OTHER SPECIFIED NONINFLAMMATORY DISORDERS OF VAGINA: ICD-10-CM

## 2022-05-06 DIAGNOSIS — Z11.3 ENCOUNTER FOR SCREENING FOR INFECTIONS WITH A PREDOMINANTLY SEXUAL MODE OF TRANSMISSION: ICD-10-CM

## 2022-05-06 LAB
C TRACH RRNA SPEC QL NAA+PROBE: SIGNIFICANT CHANGE UP
CANDIDA AB TITR SER: SIGNIFICANT CHANGE UP
G VAGINALIS DNA SPEC QL NAA+PROBE: DETECTED
HBV SURFACE AG SER-ACNC: SIGNIFICANT CHANGE UP
HCV AB S/CO SERPL IA: 0.11 S/CO — SIGNIFICANT CHANGE UP (ref 0–0.99)
HCV AB SERPL-IMP: SIGNIFICANT CHANGE UP
N GONORRHOEA RRNA SPEC QL NAA+PROBE: SIGNIFICANT CHANGE UP
SPECIMEN SOURCE: SIGNIFICANT CHANGE UP
T PALLIDUM AB TITR SER: NEGATIVE — SIGNIFICANT CHANGE UP
T VAGINALIS SPEC QL WET PREP: SIGNIFICANT CHANGE UP

## 2022-05-09 RX ORDER — METRONIDAZOLE 500 MG/1
500 TABLET ORAL TWICE DAILY
Qty: 14 | Refills: 0 | Status: ACTIVE | COMMUNITY
Start: 2022-05-09 | End: 1900-01-01

## 2022-06-29 NOTE — ED ADULT NURSE NOTE - DOES PATIENT HAVE ADVANCE DIRECTIVE
Relevant Problems   RESPIRATORY SYSTEM   (+) RONI (obstructive sleep apnea)      CARDIOVASCULAR   (+) Essential hypertension      RENAL FAILURE   (+) ESRD (end stage renal disease) on dialysis (HCC)      ENDOCRINE   (+) Obesity, morbid (HCC)   (+) Type 2 diabetes mellitus with chronic kidney disease, without long-term current use of insulin, unspecified CKD stage (HCC)      HEMATOLOGY   (+) Anemia       Anesthetic History   No history of anesthetic complications            Review of Systems / Medical History  Patient summary reviewed and pertinent labs reviewed    Pulmonary        Sleep apnea: CPAP           Neuro/Psych   Within defined limits           Cardiovascular    Hypertension                   GI/Hepatic/Renal         Renal disease: ESRD and dialysis       Endo/Other    Diabetes    Obesity and arthritis     Other Findings              Physical Exam    Airway  Mallampati: II  TM Distance: 4 - 6 cm  Neck ROM: normal range of motion   Mouth opening: Normal     Cardiovascular  Regular rate and rhythm,  S1 and S2 normal,  no murmur, click, rub, or gallop  Rhythm: regular  Rate: normal         Dental  No notable dental hx       Pulmonary  Breath sounds clear to auscultation               Abdominal  GI exam deferred       Other Findings            Anesthetic Plan    ASA: 4  Anesthesia type: MAC          Induction: Intravenous  Anesthetic plan and risks discussed with: Patient
No

## 2022-08-04 ENCOUNTER — APPOINTMENT (OUTPATIENT)
Dept: OBGYN | Facility: HOSPITAL | Age: 42
End: 2022-08-04

## 2022-11-08 ENCOUNTER — OUTPATIENT (OUTPATIENT)
Dept: OUTPATIENT SERVICES | Facility: HOSPITAL | Age: 42
LOS: 1 days | End: 2022-11-08

## 2022-11-08 ENCOUNTER — RESULT REVIEW (OUTPATIENT)
Age: 42
End: 2022-11-08

## 2022-11-08 ENCOUNTER — APPOINTMENT (OUTPATIENT)
Dept: OBGYN | Facility: HOSPITAL | Age: 42
End: 2022-11-08

## 2022-11-08 VITALS
DIASTOLIC BLOOD PRESSURE: 66 MMHG | HEIGHT: 69 IN | BODY MASS INDEX: 25.77 KG/M2 | WEIGHT: 174 LBS | TEMPERATURE: 97.3 F | SYSTOLIC BLOOD PRESSURE: 113 MMHG | HEART RATE: 74 BPM

## 2022-11-08 DIAGNOSIS — B00.1 HERPESVIRAL VESICULAR DERMATITIS: ICD-10-CM

## 2022-11-08 DIAGNOSIS — O02.1 MISSED ABORTION: Chronic | ICD-10-CM

## 2022-11-08 LAB — HIV 1+2 AB+HIV1 P24 AG SERPL QL IA: SIGNIFICANT CHANGE UP

## 2022-11-08 NOTE — PHYSICAL EXAM
[Chaperone Present] : A chaperone was present in the examining room during all aspects of the physical examination [Labia Majora] : normal [Labia Minora] : normal [Discharge] : a  ~M vaginal discharge was present [Moderate] : moderate [White] : white [Watery] : watery [Normal] : normal [Uterine Adnexae] : normal

## 2022-11-08 NOTE — DISCUSSION/SUMMARY
[FreeTextEntry1] : Bacterial Vaginosis\par --BV panel done\par --reports hx treatment with Flagyl with poor response\par --Rx Clindamycin PO every 8 hours\par --fluconazole PO twice weekly\par --advised probiotics\par \par STD testing\par --STD testing today\par \par IUD check\par --bedside sono done to confirm IUD location\par \par HSV\par --valtrex 500 mg BID for 5 days prn\par \par Follow up for annual/prn\par

## 2022-11-08 NOTE — HISTORY OF PRESENT ILLNESS
[FreeTextEntry1] : 41 yo female LMP 10/30/2022 here requesting STD testing, check on IUD, and c/o vaginal discharge and irritation which she thinks is BV and needs refill for Valtrex as she had cold sores last month.

## 2022-11-08 NOTE — COUNSELING
[Nutrition/ Exercise/ Weight Management] : nutrition, exercise, weight management [Vitamins/Supplements] : vitamins/supplements [Contraception/ Emergency Contraception/ Safe Sexual Practices] : contraception, emergency contraception, safe sexual practices [STD (testing, results, tx)] : STD (testing, results, tx)

## 2022-11-09 DIAGNOSIS — B00.1 HERPESVIRAL VESICULAR DERMATITIS: ICD-10-CM

## 2022-11-09 DIAGNOSIS — B96.89 OTHER SPECIFIED BACTERIAL AGENTS AS THE CAUSE OF DISEASES CLASSIFIED ELSEWHERE: ICD-10-CM

## 2022-11-09 DIAGNOSIS — Z30.431 ENCOUNTER FOR ROUTINE CHECKING OF INTRAUTERINE CONTRACEPTIVE DEVICE: ICD-10-CM

## 2022-11-09 DIAGNOSIS — Z00.00 ENCOUNTER FOR GENERAL ADULT MEDICAL EXAMINATION WITHOUT ABNORMAL FINDINGS: ICD-10-CM

## 2022-11-09 DIAGNOSIS — Z11.3 ENCOUNTER FOR SCREENING FOR INFECTIONS WITH A PREDOMINANTLY SEXUAL MODE OF TRANSMISSION: ICD-10-CM

## 2022-11-09 LAB
C TRACH RRNA SPEC QL NAA+PROBE: SIGNIFICANT CHANGE UP
CANDIDA AB TITR SER: SIGNIFICANT CHANGE UP
G VAGINALIS DNA SPEC QL NAA+PROBE: SIGNIFICANT CHANGE UP
HBV SURFACE AG SER-ACNC: SIGNIFICANT CHANGE UP
HCV AB S/CO SERPL IA: 0.08 S/CO — SIGNIFICANT CHANGE UP (ref 0–0.99)
HCV AB SERPL-IMP: SIGNIFICANT CHANGE UP
N GONORRHOEA RRNA SPEC QL NAA+PROBE: SIGNIFICANT CHANGE UP
SPECIMEN SOURCE: SIGNIFICANT CHANGE UP
T PALLIDUM AB TITR SER: NEGATIVE — SIGNIFICANT CHANGE UP
T VAGINALIS SPEC QL WET PREP: SIGNIFICANT CHANGE UP

## 2023-04-07 ENCOUNTER — RX RENEWAL (OUTPATIENT)
Age: 43
End: 2023-04-07

## 2023-04-07 RX ORDER — FLUCONAZOLE 150 MG/1
150 TABLET ORAL
Qty: 2 | Refills: 1 | Status: ACTIVE | COMMUNITY
Start: 2022-11-08 | End: 1900-01-01

## 2023-04-14 ENCOUNTER — APPOINTMENT (OUTPATIENT)
Dept: OBGYN | Facility: HOSPITAL | Age: 43
End: 2023-04-14

## 2023-05-02 ENCOUNTER — RESULT REVIEW (OUTPATIENT)
Age: 43
End: 2023-05-02

## 2023-05-02 ENCOUNTER — APPOINTMENT (OUTPATIENT)
Dept: OBGYN | Facility: HOSPITAL | Age: 43
End: 2023-05-02

## 2023-05-02 ENCOUNTER — OUTPATIENT (OUTPATIENT)
Dept: OUTPATIENT SERVICES | Facility: HOSPITAL | Age: 43
LOS: 1 days | End: 2023-05-02

## 2023-05-02 VITALS
HEART RATE: 70 BPM | SYSTOLIC BLOOD PRESSURE: 119 MMHG | TEMPERATURE: 98 F | HEIGHT: 69 IN | WEIGHT: 179 LBS | BODY MASS INDEX: 26.51 KG/M2 | DIASTOLIC BLOOD PRESSURE: 80 MMHG

## 2023-05-02 DIAGNOSIS — O02.1 MISSED ABORTION: Chronic | ICD-10-CM

## 2023-05-02 LAB
A1C WITH ESTIMATED AVERAGE GLUCOSE RESULT: 5.7 % — HIGH (ref 4–5.6)
ALBUMIN SERPL ELPH-MCNC: 4.7 G/DL — SIGNIFICANT CHANGE UP (ref 3.3–5)
ALP SERPL-CCNC: 50 U/L — SIGNIFICANT CHANGE UP (ref 40–120)
ALT FLD-CCNC: 16 U/L — SIGNIFICANT CHANGE UP (ref 4–33)
ANION GAP SERPL CALC-SCNC: 12 MMOL/L — SIGNIFICANT CHANGE UP (ref 7–14)
AST SERPL-CCNC: 15 U/L — SIGNIFICANT CHANGE UP (ref 4–32)
BASOPHILS # BLD AUTO: 0.02 K/UL — SIGNIFICANT CHANGE UP (ref 0–0.2)
BASOPHILS NFR BLD AUTO: 0.3 % — SIGNIFICANT CHANGE UP (ref 0–2)
BILIRUB SERPL-MCNC: 1.1 MG/DL — SIGNIFICANT CHANGE UP (ref 0.2–1.2)
BUN SERPL-MCNC: 9 MG/DL — SIGNIFICANT CHANGE UP (ref 7–23)
CALCIUM SERPL-MCNC: 9.7 MG/DL — SIGNIFICANT CHANGE UP (ref 8.4–10.5)
CHLORIDE SERPL-SCNC: 101 MMOL/L — SIGNIFICANT CHANGE UP (ref 98–107)
CO2 SERPL-SCNC: 24 MMOL/L — SIGNIFICANT CHANGE UP (ref 22–31)
CREAT SERPL-MCNC: 0.87 MG/DL — SIGNIFICANT CHANGE UP (ref 0.5–1.3)
EGFR: 86 ML/MIN/1.73M2 — SIGNIFICANT CHANGE UP
EOSINOPHIL # BLD AUTO: 0.19 K/UL — SIGNIFICANT CHANGE UP (ref 0–0.5)
EOSINOPHIL NFR BLD AUTO: 3 % — SIGNIFICANT CHANGE UP (ref 0–6)
ESTIMATED AVERAGE GLUCOSE: 117 — SIGNIFICANT CHANGE UP
GLUCOSE SERPL-MCNC: 89 MG/DL — SIGNIFICANT CHANGE UP (ref 70–99)
HCT VFR BLD CALC: 39.5 % — SIGNIFICANT CHANGE UP (ref 34.5–45)
HGB BLD-MCNC: 13.1 G/DL — SIGNIFICANT CHANGE UP (ref 11.5–15.5)
IANC: 3.74 K/UL — SIGNIFICANT CHANGE UP (ref 1.8–7.4)
IMM GRANULOCYTES NFR BLD AUTO: 0.9 % — SIGNIFICANT CHANGE UP (ref 0–0.9)
LYMPHOCYTES # BLD AUTO: 1.97 K/UL — SIGNIFICANT CHANGE UP (ref 1–3.3)
LYMPHOCYTES # BLD AUTO: 30.9 % — SIGNIFICANT CHANGE UP (ref 13–44)
MCHC RBC-ENTMCNC: 29.8 PG — SIGNIFICANT CHANGE UP (ref 27–34)
MCHC RBC-ENTMCNC: 33.2 GM/DL — SIGNIFICANT CHANGE UP (ref 32–36)
MCV RBC AUTO: 90 FL — SIGNIFICANT CHANGE UP (ref 80–100)
MONOCYTES # BLD AUTO: 0.39 K/UL — SIGNIFICANT CHANGE UP (ref 0–0.9)
MONOCYTES NFR BLD AUTO: 6.1 % — SIGNIFICANT CHANGE UP (ref 2–14)
NEUTROPHILS # BLD AUTO: 3.74 K/UL — SIGNIFICANT CHANGE UP (ref 1.8–7.4)
NEUTROPHILS NFR BLD AUTO: 58.8 % — SIGNIFICANT CHANGE UP (ref 43–77)
NRBC # BLD: 0 /100 WBCS — SIGNIFICANT CHANGE UP (ref 0–0)
NRBC # FLD: 0 K/UL — SIGNIFICANT CHANGE UP (ref 0–0)
PLATELET # BLD AUTO: 243 K/UL — SIGNIFICANT CHANGE UP (ref 150–400)
POTASSIUM SERPL-MCNC: 3.8 MMOL/L — SIGNIFICANT CHANGE UP (ref 3.5–5.3)
POTASSIUM SERPL-SCNC: 3.8 MMOL/L — SIGNIFICANT CHANGE UP (ref 3.5–5.3)
PROT SERPL-MCNC: 7.5 G/DL — SIGNIFICANT CHANGE UP (ref 6–8.3)
RBC # BLD: 4.39 M/UL — SIGNIFICANT CHANGE UP (ref 3.8–5.2)
RBC # FLD: 12.6 % — SIGNIFICANT CHANGE UP (ref 10.3–14.5)
SODIUM SERPL-SCNC: 137 MMOL/L — SIGNIFICANT CHANGE UP (ref 135–145)
TSH SERPL-MCNC: 0.88 UIU/ML — SIGNIFICANT CHANGE UP (ref 0.27–4.2)
WBC # BLD: 6.37 K/UL — SIGNIFICANT CHANGE UP (ref 3.8–10.5)
WBC # FLD AUTO: 6.37 K/UL — SIGNIFICANT CHANGE UP (ref 3.8–10.5)

## 2023-05-02 NOTE — HISTORY OF PRESENT ILLNESS
[HIV Test offered] : HIV Test offered [Syphilis test offered] : Syphilis test offered [Gonorrhea test offered] : Gonorrhea test offered [Chlamydia test offered] : Chlamydia test offered [Trichomonas test offered] : Trichomonas test offered [Hepatitis B test offered] : Hepatitis B test offered [Hepatitis C test offered] : Hepatitis C test offered [TextBox_4] : 41 yo  LMP 4/2023 here for annual exam.  c/o recurrent yeast infections.  Uses boric acid but has recurrent clumpy white discharge (shows me pictures on her phone) with irritation.  Had intercourse this morning and washed out (doushced) vagina before this appt.   Has IUD insitu, same sexual partner. [PapSmeardate] : 01/21 [Currently Active] : currently active [Men] : men [Vaginal] : vaginal [No] : No [Yes] : Yes [Patient would like to be screened for STIs] : Patient would like to be screened for STIs [FreeTextEntry3] : IUD

## 2023-05-02 NOTE — PHYSICAL EXAM
[Chaperone Present] : A chaperone was present in the examining room during all aspects of the physical examination [Appropriately responsive] : appropriately responsive [Alert] : alert [No Acute Distress] : no acute distress [No Lymphadenopathy] : no lymphadenopathy [Soft] : soft [Non-tender] : non-tender [Non-distended] : non-distended [No HSM] : No HSM [No Lesions] : no lesions [No Mass] : no mass [Oriented x3] : oriented x3 [Examination Of The Breasts] : a normal appearance [No Masses] : no breast masses were palpable [Labia Majora] : normal [Labia Minora] : normal [IUD String] : an IUD string was noted [Normal] : normal [Uterine Adnexae] : normal [FreeTextEntry4] : very dry--absolutely no discharge or odor

## 2023-05-02 NOTE — DISCUSSION/SUMMARY
[FreeTextEntry1] : Annual Gynn exam\par --last pap done 2021\par --GC/CHlam done\par --annual blood work including STD blood work done\par --SBE monthly\par --mammo referral\par --diet and exercise\par \par IUD check\par --short IUD string seen on exam\par \par Recurrent Yeast\par --no clinical signs of vaginitis on spec exam\par --Affirm done\par --advised probiotics daily\par --will call tomorrow with affirm results\par Follow up for annual/prn

## 2023-05-03 DIAGNOSIS — Z01.419 ENCOUNTER FOR GYNECOLOGICAL EXAMINATION (GENERAL) (ROUTINE) WITHOUT ABNORMAL FINDINGS: ICD-10-CM

## 2023-05-03 LAB
C TRACH RRNA SPEC QL NAA+PROBE: SIGNIFICANT CHANGE UP
CANDIDA AB TITR SER: SIGNIFICANT CHANGE UP
G VAGINALIS DNA SPEC QL NAA+PROBE: SIGNIFICANT CHANGE UP
HBV SURFACE AG SER-ACNC: SIGNIFICANT CHANGE UP
HCV AB S/CO SERPL IA: 0.1 S/CO — SIGNIFICANT CHANGE UP (ref 0–0.99)
HCV AB SERPL-IMP: SIGNIFICANT CHANGE UP
N GONORRHOEA RRNA SPEC QL NAA+PROBE: SIGNIFICANT CHANGE UP
SPECIMEN SOURCE: SIGNIFICANT CHANGE UP
T VAGINALIS SPEC QL WET PREP: SIGNIFICANT CHANGE UP

## 2023-08-22 ENCOUNTER — RX RENEWAL (OUTPATIENT)
Age: 43
End: 2023-08-22

## 2023-09-01 ENCOUNTER — RX RENEWAL (OUTPATIENT)
Age: 43
End: 2023-09-01

## 2023-09-29 DIAGNOSIS — A04.4 OTHER INTESTINAL ESCHERICHIA COLI INFECTIONS: ICD-10-CM

## 2023-09-29 LAB
CDIFF BY PCR: NOT DETECTED
ENTEROTOXIGENIC E.COLI (ETEC) LT/ST: DETECTED
GI PCR PANEL: DETECTED

## 2023-09-29 RX ORDER — CIPROFLOXACIN HYDROCHLORIDE 500 MG/1
500 TABLET, FILM COATED ORAL TWICE DAILY
Qty: 6 | Refills: 0 | Status: ACTIVE | COMMUNITY
Start: 2023-09-29 | End: 1900-01-01

## 2023-10-02 LAB — BACTERIA STL CULT: NORMAL

## 2023-10-04 LAB — CALPROTECTIN FECAL: 209 UG/G

## 2023-11-13 ENCOUNTER — APPOINTMENT (OUTPATIENT)
Dept: GASTROENTEROLOGY | Facility: CLINIC | Age: 43
End: 2023-11-13

## 2023-11-14 ENCOUNTER — RX RENEWAL (OUTPATIENT)
Age: 43
End: 2023-11-14

## 2023-11-14 RX ORDER — FLUCONAZOLE 150 MG/1
150 TABLET ORAL
Qty: 2 | Refills: 1 | Status: ACTIVE | COMMUNITY
Start: 2023-04-12 | End: 1900-01-01

## 2023-11-14 RX ORDER — CLINDAMYCIN HYDROCHLORIDE 300 MG/1
300 CAPSULE ORAL
Qty: 21 | Refills: 0 | Status: ACTIVE | COMMUNITY
Start: 2022-11-08 | End: 1900-01-01

## 2023-11-14 RX ORDER — VALACYCLOVIR 500 MG/1
500 TABLET, FILM COATED ORAL TWICE DAILY
Qty: 10 | Refills: 5 | Status: ACTIVE | COMMUNITY
Start: 2022-11-08 | End: 1900-01-01

## 2024-02-08 ENCOUNTER — APPOINTMENT (OUTPATIENT)
Dept: OBGYN | Facility: HOSPITAL | Age: 44
End: 2024-02-08
Payer: MEDICAID

## 2024-02-08 ENCOUNTER — OUTPATIENT (OUTPATIENT)
Dept: OUTPATIENT SERVICES | Facility: HOSPITAL | Age: 44
LOS: 1 days | End: 2024-02-08

## 2024-02-08 ENCOUNTER — RESULT REVIEW (OUTPATIENT)
Age: 44
End: 2024-02-08

## 2024-02-08 VITALS
TEMPERATURE: 97.9 F | SYSTOLIC BLOOD PRESSURE: 126 MMHG | HEART RATE: 85 BPM | DIASTOLIC BLOOD PRESSURE: 85 MMHG | HEIGHT: 69 IN | BODY MASS INDEX: 26.36 KG/M2 | WEIGHT: 178 LBS

## 2024-02-08 DIAGNOSIS — Z11.3 ENCOUNTER FOR SCREENING FOR INFECTIONS WITH A PREDOMINANTLY SEXUAL MODE OF TRANSMISSION: ICD-10-CM

## 2024-02-08 DIAGNOSIS — K64.9 UNSPECIFIED HEMORRHOIDS: ICD-10-CM

## 2024-02-08 DIAGNOSIS — O02.1 MISSED ABORTION: Chronic | ICD-10-CM

## 2024-02-08 LAB
HCG UR QL: NEGATIVE
HIV 1+2 AB+HIV1 P24 AG SERPL QL IA: SIGNIFICANT CHANGE UP
QUALITY CONTROL: YES

## 2024-02-08 PROCEDURE — 99214 OFFICE O/P EST MOD 30 MIN: CPT | Mod: 25

## 2024-02-08 RX ORDER — HYDROCORTISONE 2.5% 25 MG/G
2.5 CREAM TOPICAL 3 TIMES DAILY
Qty: 1 | Refills: 3 | Status: ACTIVE | COMMUNITY
Start: 2024-02-08 | End: 1900-01-01

## 2024-02-08 NOTE — DISCUSSION/SUMMARY
[FreeTextEntry1] : Bacterial vaginosis --symptoms c/w BV --Affirm done --clindamycin cream QHS --Fluconazole PO once --decrease use of boric acid --Vag care, hygiene and probiotics suggested  Family planning/IUD check --difficult to see string on exam --IUD noted in correct position on sono --STD counseling, safe sex and condoms stressed  Hemorrhoids --diet and increasing fluids --Proctozone cream TID Follow up for annual in May 2024

## 2024-02-08 NOTE — PHYSICAL EXAM
[Chaperone Present] : A chaperone was present in the examining room during all aspects of the physical examination [FreeTextEntry1] : Daren [Appropriately responsive] : appropriately responsive [Alert] : alert [No Acute Distress] : no acute distress [Soft] : soft [Non-tender] : non-tender [Non-distended] : non-distended [Labia Majora] : normal [Labia Minora] : normal [Normal] : normal [Uterine Adnexae] : normal

## 2024-02-08 NOTE — PROCEDURE
[Locate IUD] : locate IUD [Transvaginal Ultrasound] : transvaginal ultrasound [FreeTextEntry4] : IUD in correct position within uterine cavity

## 2024-02-08 NOTE — HISTORY OF PRESENT ILLNESS
[FreeTextEntry1] : 44 yo  LMP 1/2024 here with complaints of vaginal irritation and odor.  Has had BV in the past and uses boric acid and probiotics regularly.  Sexually active with one male partner.  Asking to check to be sure her IUD is in place.  Also c/o hemorrhoid. [Currently Active] : currently active [Men] : men [Vaginal] : vaginal [No] : No [Yes] : Yes [Patient would like to be screened for STIs] : Patient would like to be screened for STIs [FreeTextEntry3] : IUD

## 2024-02-09 LAB
C TRACH RRNA SPEC QL NAA+PROBE: SIGNIFICANT CHANGE UP
CANDIDA AB TITR SER: SIGNIFICANT CHANGE UP
G VAGINALIS DNA SPEC QL NAA+PROBE: DETECTED
HBV SURFACE AG SER-ACNC: SIGNIFICANT CHANGE UP
HCV AB S/CO SERPL IA: 0.09 S/CO — SIGNIFICANT CHANGE UP (ref 0–0.99)
HCV AB SERPL-IMP: SIGNIFICANT CHANGE UP
N GONORRHOEA RRNA SPEC QL NAA+PROBE: SIGNIFICANT CHANGE UP
SPECIMEN SOURCE: SIGNIFICANT CHANGE UP
T PALLIDUM AB TITR SER: NEGATIVE — SIGNIFICANT CHANGE UP
T VAGINALIS SPEC QL WET PREP: SIGNIFICANT CHANGE UP

## 2024-02-12 DIAGNOSIS — K64.9 UNSPECIFIED HEMORRHOIDS: ICD-10-CM

## 2024-02-12 DIAGNOSIS — N76.0 ACUTE VAGINITIS: ICD-10-CM

## 2024-02-12 DIAGNOSIS — Z11.3 ENCOUNTER FOR SCREENING FOR INFECTIONS WITH A PREDOMINANTLY SEXUAL MODE OF TRANSMISSION: ICD-10-CM

## 2024-02-13 NOTE — ED ADULT NURSE REASSESSMENT NOTE - NS ED NURSE REASSESS COMMENT FT1
Pt sitting up in stretcher, NAD, VSS. Pt states the abdominal pain and nausea is "milder" since medication administration.
No

## 2024-04-04 ENCOUNTER — RX RENEWAL (OUTPATIENT)
Age: 44
End: 2024-04-04

## 2024-04-04 RX ORDER — FLUCONAZOLE 150 MG/1
150 TABLET ORAL DAILY
Qty: 1 | Refills: 1 | Status: ACTIVE | COMMUNITY
Start: 2024-02-08 | End: 1900-01-01

## 2024-05-22 ENCOUNTER — RX RENEWAL (OUTPATIENT)
Age: 44
End: 2024-05-22

## 2024-05-22 RX ORDER — CLINDAMYCIN PHOSPHATE 20 MG/G
2 CREAM VAGINAL
Qty: 40 | Refills: 1 | Status: ACTIVE | COMMUNITY
Start: 2024-02-08 | End: 1900-01-01

## 2024-06-07 ENCOUNTER — RESULT REVIEW (OUTPATIENT)
Age: 44
End: 2024-06-07

## 2024-06-07 ENCOUNTER — APPOINTMENT (OUTPATIENT)
Dept: OBGYN | Facility: HOSPITAL | Age: 44
End: 2024-06-07
Payer: MEDICAID

## 2024-06-07 ENCOUNTER — OUTPATIENT (OUTPATIENT)
Dept: OUTPATIENT SERVICES | Facility: HOSPITAL | Age: 44
LOS: 1 days | End: 2024-06-07

## 2024-06-07 VITALS
WEIGHT: 194 LBS | HEART RATE: 59 BPM | HEIGHT: 69 IN | BODY MASS INDEX: 28.73 KG/M2 | DIASTOLIC BLOOD PRESSURE: 64 MMHG | TEMPERATURE: 97.9 F | SYSTOLIC BLOOD PRESSURE: 107 MMHG

## 2024-06-07 DIAGNOSIS — Z30.431 ENCOUNTER FOR ROUTINE CHECKING OF INTRAUTERINE CONTRACEPTIVE DEVICE: ICD-10-CM

## 2024-06-07 DIAGNOSIS — Z01.419 ENCOUNTER FOR GYNECOLOGICAL EXAMINATION (GENERAL) (ROUTINE) W/OUT ABNORMAL FINDINGS: ICD-10-CM

## 2024-06-07 DIAGNOSIS — B96.89 ACUTE VAGINITIS: ICD-10-CM

## 2024-06-07 DIAGNOSIS — N76.0 ACUTE VAGINITIS: ICD-10-CM

## 2024-06-07 DIAGNOSIS — O02.1 MISSED ABORTION: Chronic | ICD-10-CM

## 2024-06-07 LAB
A1C WITH ESTIMATED AVERAGE GLUCOSE RESULT: 5.4 % — SIGNIFICANT CHANGE UP (ref 4–5.6)
ANION GAP SERPL CALC-SCNC: 10 MMOL/L — SIGNIFICANT CHANGE UP (ref 7–14)
BUN SERPL-MCNC: 10 MG/DL — SIGNIFICANT CHANGE UP (ref 7–23)
C TRACH RRNA SPEC QL NAA+PROBE: SIGNIFICANT CHANGE UP
CALCIUM SERPL-MCNC: 9 MG/DL — SIGNIFICANT CHANGE UP (ref 8.4–10.5)
CHLORIDE SERPL-SCNC: 105 MMOL/L — SIGNIFICANT CHANGE UP (ref 98–107)
CO2 SERPL-SCNC: 24 MMOL/L — SIGNIFICANT CHANGE UP (ref 22–31)
CREAT SERPL-MCNC: 0.78 MG/DL — SIGNIFICANT CHANGE UP (ref 0.5–1.3)
EGFR: 98 ML/MIN/1.73M2 — SIGNIFICANT CHANGE UP
ESTIMATED AVERAGE GLUCOSE: 108 — SIGNIFICANT CHANGE UP
GLUCOSE SERPL-MCNC: 85 MG/DL — SIGNIFICANT CHANGE UP (ref 70–99)
HCT VFR BLD CALC: 35.7 % — SIGNIFICANT CHANGE UP (ref 34.5–45)
HGB BLD-MCNC: 12.1 G/DL — SIGNIFICANT CHANGE UP (ref 11.5–15.5)
HIV 1+2 AB+HIV1 P24 AG SERPL QL IA: SIGNIFICANT CHANGE UP
MCHC RBC-ENTMCNC: 30.9 PG — SIGNIFICANT CHANGE UP (ref 27–34)
MCHC RBC-ENTMCNC: 33.9 GM/DL — SIGNIFICANT CHANGE UP (ref 32–36)
MCV RBC AUTO: 91.3 FL — SIGNIFICANT CHANGE UP (ref 80–100)
N GONORRHOEA RRNA SPEC QL NAA+PROBE: SIGNIFICANT CHANGE UP
NRBC # BLD: 0 /100 WBCS — SIGNIFICANT CHANGE UP (ref 0–0)
NRBC # FLD: 0 K/UL — SIGNIFICANT CHANGE UP (ref 0–0)
PLATELET # BLD AUTO: 234 K/UL — SIGNIFICANT CHANGE UP (ref 150–400)
POTASSIUM SERPL-MCNC: 4.1 MMOL/L — SIGNIFICANT CHANGE UP (ref 3.5–5.3)
POTASSIUM SERPL-SCNC: 4.1 MMOL/L — SIGNIFICANT CHANGE UP (ref 3.5–5.3)
RBC # BLD: 3.91 M/UL — SIGNIFICANT CHANGE UP (ref 3.8–5.2)
RBC # FLD: 13.1 % — SIGNIFICANT CHANGE UP (ref 10.3–14.5)
SODIUM SERPL-SCNC: 139 MMOL/L — SIGNIFICANT CHANGE UP (ref 135–145)
SPECIMEN SOURCE: SIGNIFICANT CHANGE UP
T PALLIDUM AB TITR SER: NEGATIVE — SIGNIFICANT CHANGE UP
TSH SERPL-MCNC: 1.5 UIU/ML — SIGNIFICANT CHANGE UP (ref 0.27–4.2)
WBC # BLD: 6.02 K/UL — SIGNIFICANT CHANGE UP (ref 3.8–10.5)
WBC # FLD AUTO: 6.02 K/UL — SIGNIFICANT CHANGE UP (ref 3.8–10.5)

## 2024-06-07 PROCEDURE — 99396 PREV VISIT EST AGE 40-64: CPT | Mod: 25

## 2024-06-07 RX ORDER — FLUCONAZOLE 150 MG/1
150 TABLET ORAL
Qty: 2 | Refills: 1 | Status: ACTIVE | COMMUNITY
Start: 2024-06-07 | End: 1900-01-01

## 2024-06-07 RX ORDER — CLINDAMYCIN PHOSPHATE 20 MG/G
2 CREAM VAGINAL
Qty: 1 | Refills: 1 | Status: ACTIVE | COMMUNITY
Start: 2024-06-07 | End: 1900-01-01

## 2024-06-07 NOTE — HISTORY OF PRESENT ILLNESS
[Patient reported PAP Smear was normal] : Patient reported PAP Smear was normal [HIV Test offered] : HIV Test offered [Syphilis test offered] : Syphilis test offered [Gonorrhea test offered] : Gonorrhea test offered [Chlamydia test offered] : Chlamydia test offered [Trichomonas test offered] : Trichomonas test offered [HPV test offered] : HPV test offered [Hepatitis B test offered] : Hepatitis B test offered [Hepatitis C test offered] : Hepatitis C test offered [N] : Patient reports normal menses [IUD] : has an intrauterine device [Y] : Patient is sexually active [TextBox_4] : 42 yo  LMP 5/12/24 here for routine annual Gyn exam.  c/o vaginal itching, odor and discharge.  Feels this is a recurring problem.   Uses probiotics, has one male sexual partner, has Paragard IUD in place. [PapSmeardate] : 2021 [LMPDate] : 05/12/24 [Currently Active] : currently active [Men] : men [Vaginal] : vaginal [No] : No [Yes] : Yes [Patient would like to be screened for STIs] : Patient would like to be screened for STIs [FreeTextEntry3] : IUD

## 2024-06-07 NOTE — PHYSICAL EXAM
[Chaperone Present] : A chaperone was present in the examining room during all aspects of the physical examination [[id number]] : A chaperone was present during the pelvic exam. [FreeTextEntry2] : Shae [Appropriately responsive] : appropriately responsive [Alert] : alert [No Acute Distress] : no acute distress [No Lymphadenopathy] : no lymphadenopathy [Soft] : soft [Non-tender] : non-tender [Non-distended] : non-distended [No HSM] : No HSM [No Lesions] : no lesions [No Mass] : no mass [Oriented x3] : oriented x3 [Examination Of The Breasts] : a normal appearance [No Masses] : no breast masses were palpable [Labia Majora] : normal [Labia Minora] : normal [Normal] : normal [Discharge] : discharge [Scant] : scant [Foul Smelling] : foul smelling [White] : white [Anteversion] : anteverted [Uterine Adnexae] : normal [FreeTextEntry5] : very difficult to visualize due to weak vaginal walls

## 2024-06-07 NOTE — DISCUSSION/SUMMARY
[FreeTextEntry1] : Annual Gyn  --Pap, HPV, GC/Chlam done --Annual blood work including STD blood work done --SBE monthly --Diet and exercise  Bacterial Vaginosis --discharge and symptoms c/w BV --Affirm done --Clindamycin cream QHS --Fluconazole PO once --Vag care and hygiene discussed --Probiotics advised --Sitz baths with epsom salts  IUD check --difficult to visualize string on exam but very short string palpable --STD counseling, safe sex practices and condoms advised Follow up for annual/prn

## 2024-06-08 LAB
CANDIDA AB TITR SER: SIGNIFICANT CHANGE UP
CULTURE RESULTS: SIGNIFICANT CHANGE UP
G VAGINALIS DNA SPEC QL NAA+PROBE: SIGNIFICANT CHANGE UP
HBV SURFACE AG SER-ACNC: SIGNIFICANT CHANGE UP
HCV AB S/CO SERPL IA: 0.09 S/CO — SIGNIFICANT CHANGE UP (ref 0–0.99)
HCV AB SERPL-IMP: SIGNIFICANT CHANGE UP
SPECIMEN SOURCE: SIGNIFICANT CHANGE UP
T VAGINALIS SPEC QL WET PREP: SIGNIFICANT CHANGE UP

## 2024-06-10 DIAGNOSIS — Z30.431 ENCOUNTER FOR ROUTINE CHECKING OF INTRAUTERINE CONTRACEPTIVE DEVICE: ICD-10-CM

## 2024-06-10 DIAGNOSIS — Z01.419 ENCOUNTER FOR GYNECOLOGICAL EXAMINATION (GENERAL) (ROUTINE) WITHOUT ABNORMAL FINDINGS: ICD-10-CM

## 2024-06-10 DIAGNOSIS — N76.0 ACUTE VAGINITIS: ICD-10-CM

## 2024-06-10 LAB
HPV GENOTYPE 16: SIGNIFICANT CHANGE UP
HPV GENOTYPE 18/45: SIGNIFICANT CHANGE UP
HPV HIGH+LOW RISK DNA PNL CVX: DETECTED

## 2024-06-12 ENCOUNTER — NON-APPOINTMENT (OUTPATIENT)
Age: 44
End: 2024-06-12

## 2024-06-12 LAB — CYTOLOGY SPEC DOC CYTO: SIGNIFICANT CHANGE UP

## 2024-07-08 ENCOUNTER — APPOINTMENT (OUTPATIENT)
Dept: INTERNAL MEDICINE | Facility: CLINIC | Age: 44
End: 2024-07-08

## 2024-07-26 ENCOUNTER — APPOINTMENT (OUTPATIENT)
Dept: INTERNAL MEDICINE | Facility: CLINIC | Age: 44
End: 2024-07-26

## 2024-08-09 ENCOUNTER — APPOINTMENT (OUTPATIENT)
Dept: INTERNAL MEDICINE | Facility: CLINIC | Age: 44
End: 2024-08-09

## 2024-11-12 ENCOUNTER — RX RENEWAL (OUTPATIENT)
Age: 44
End: 2024-11-12